# Patient Record
Sex: MALE | Race: WHITE | NOT HISPANIC OR LATINO | Employment: FULL TIME | URBAN - NONMETROPOLITAN AREA
[De-identification: names, ages, dates, MRNs, and addresses within clinical notes are randomized per-mention and may not be internally consistent; named-entity substitution may affect disease eponyms.]

---

## 2018-07-30 ENCOUNTER — OFFICE VISIT (OUTPATIENT)
Dept: INTERNAL MEDICINE | Facility: CLINIC | Age: 28
End: 2018-07-30

## 2018-07-30 DIAGNOSIS — I10 ESSENTIAL HYPERTENSION: ICD-10-CM

## 2018-07-30 DIAGNOSIS — Z13.21 ENCOUNTER FOR VITAMIN DEFICIENCY SCREENING: ICD-10-CM

## 2018-07-30 DIAGNOSIS — R07.89 ATYPICAL CHEST PAIN: Primary | ICD-10-CM

## 2018-07-30 DIAGNOSIS — R51.9 FREQUENT HEADACHES: ICD-10-CM

## 2018-07-30 DIAGNOSIS — Z00.00 PREVENTATIVE HEALTH CARE: ICD-10-CM

## 2018-07-30 PROCEDURE — 99203 OFFICE O/P NEW LOW 30 MIN: CPT | Performed by: PHYSICIAN ASSISTANT

## 2018-07-30 RX ORDER — LISINOPRIL 10 MG/1
10 TABLET ORAL DAILY
Qty: 30 TABLET | Refills: 2 | Status: SHIPPED | OUTPATIENT
Start: 2018-07-30 | End: 2018-11-05 | Stop reason: SDUPTHER

## 2018-07-30 NOTE — PROGRESS NOTES
Subjective   Justyn Hudson is a 28 y.o. male who presents to [facility].    Chief Complaint:  His mother  from a massive heart attack sometime in her 40s. He is concerned today with elevated blood pressure and occasional intermittent sharp chest pains. He has had some heartburn which he occasionally takes Tums for but does not feel it correlated with the times chest pains occur. He checked his blood pressure last week at a pharmacy which was around 140/98.     He reports a history of chronic headaches all his life which occur 1-3 times per week. Headaches are sometimes behind the eyes and typically one sided but the side varies.  He denies nausea or vomiting with headaches but has occasional photophobia. Headaches have not been worsening.         The following portions of the patient's history were reviewed and updated as appropriate: He  has no past medical history on file.  He  does not have any pertinent problems on file.  He  has a past surgical history that includes Cleft lip repair and Nose surgery.  His family history includes Cancer in his maternal grandmother; Dementia in his paternal grandmother; Diabetes in his paternal grandmother; Heart attack in his mother; Hypertension in his father; Migraines in his mother; Obesity in his father and mother; Stroke in his paternal grandmother; Leilani Parkinson White syndrome in his father.  He  reports that he has never smoked. He has never used smokeless tobacco. He reports that he drinks alcohol. He reports that he does not use drugs.  Current Outpatient Prescriptions   Medication Sig Dispense Refill   • lisinopril (PRINIVIL,ZESTRIL) 10 MG tablet Take 1 tablet by mouth Daily. 30 tablet 2     No current facility-administered medications for this visit.        Review of Systems  Review of Systems   Constitutional: Negative for activity change, appetite change, chills, fatigue, fever and unexpected weight change.        No malaise   HENT: Negative for dental  "problem, ear pain, hearing loss, nosebleeds, rhinorrhea, sore throat, trouble swallowing and voice change.    Eyes: Negative for pain, discharge, redness, itching and visual disturbance.        No dry eyes   Respiratory: Negative for cough, chest tightness, shortness of breath and wheezing.         No SOB with exertion  No SOB lying down   Cardiovascular: Positive for chest pain. Negative for palpitations and leg swelling.        No leg cramps   Gastrointestinal: Negative for abdominal pain, blood in stool, constipation, diarrhea, nausea and vomiting.        Heartburn  No change in bowel habits   Endocrine: Negative for cold intolerance, heat intolerance, polydipsia, polyphagia and polyuria.        No Muscle weakness  No feeling of weakness  No Hot flashes   Genitourinary: Negative for decreased urine volume, difficulty urinating, discharge, dysuria, frequency, hematuria, penile pain, testicular pain and urgency.        No lump on testicles   Musculoskeletal: Negative for arthralgias, gait problem, joint swelling and myalgias.        No limb pain  No limb swelling   Skin: Negative for rash and wound.        No rash  No lesions  No Itching  No change in mole   Neurological: Positive for headaches. Negative for dizziness, tremors, seizures, syncope, weakness and numbness.        No trouble walking  No confusion  No tingling       Hematological: Negative for adenopathy. Does not bruise/bleed easily.   Psychiatric/Behavioral: Negative for agitation, confusion, dysphoric mood, sleep disturbance and suicidal ideas. The patient is not nervous/anxious.         No change in personality         Objective    /98   Pulse 97   Temp 98.6 °F (37 °C)   Ht 68.5 cm (26.97\")   Wt 114 kg (252 lb)   SpO2 100%   .60 kg/m²   Physical Exam   Constitutional: He is oriented to person, place, and time. He appears well-developed and well-nourished. No distress.   HENT:   Head: Normocephalic and atraumatic.   Mouth/Throat: " Oropharynx is clear and moist.   Eyes: Pupils are equal, round, and reactive to light. EOM are normal.   Neck: Normal range of motion. Neck supple.   Cardiovascular: Normal rate, regular rhythm and normal heart sounds.  Exam reveals no gallop and no friction rub.    No murmur heard.  Pulmonary/Chest: Effort normal and breath sounds normal. No respiratory distress. He has no wheezes. He has no rales. He exhibits no tenderness.   Abdominal: Soft. Bowel sounds are normal. He exhibits no distension. There is no rebound and no guarding. No hernia.   Musculoskeletal: Normal range of motion. He exhibits no tenderness.   Neurological: He is alert and oriented to person, place, and time. No sensory deficit. Coordination normal.   Skin: Skin is warm and dry. Capillary refill takes less than 2 seconds. He is not diaphoretic. No erythema.   Psychiatric: He has a normal mood and affect. His behavior is normal. Judgment and thought content normal.   Nursing note and vitals reviewed.        Assessment/Plan      Diagnosis Plan   1. Atypical chest pain  Does not seem to be cardiac related. Requested that he be mindful of what he is doing when it occurs and what he has recently eaten for review at 2 week follow up.      2. Essential hypertension  US Renal Bilateral    Comprehensive Metabolic Panel    TSH    Begin: lisinopril (PRINIVIL,ZESTRIL) 10 MG tablet  -Risks, benefits and potential side effects of medication reviewed and patient agrees to use.      3. Preventative health care  Lipid Panel     4. Frequent headaches  Vitamin D 25 Hydroxy     5. Encounter for vitamin deficiency screening  Vitamin D 25 Hydroxy           Return in about 2 weeks (around 8/13/2018) for Next scheduled follow up.

## 2018-08-02 ENCOUNTER — HOSPITAL ENCOUNTER (OUTPATIENT)
Dept: ULTRASOUND IMAGING | Facility: HOSPITAL | Age: 28
Discharge: HOME | End: 2018-08-02
Admitting: PHYSICIAN ASSISTANT

## 2018-08-02 PROCEDURE — 76775 US EXAM ABDO BACK WALL LIM: CPT

## 2018-08-16 ENCOUNTER — OFFICE VISIT (OUTPATIENT)
Dept: INTERNAL MEDICINE | Facility: CLINIC | Age: 28
End: 2018-08-16

## 2018-08-16 VITALS
SYSTOLIC BLOOD PRESSURE: 140 MMHG | BODY MASS INDEX: 37.33 KG/M2 | HEART RATE: 97 BPM | OXYGEN SATURATION: 100 % | DIASTOLIC BLOOD PRESSURE: 98 MMHG | WEIGHT: 252 LBS | TEMPERATURE: 98.6 F | HEIGHT: 69 IN

## 2018-08-16 VITALS
DIASTOLIC BLOOD PRESSURE: 82 MMHG | OXYGEN SATURATION: 99 % | HEIGHT: 69 IN | TEMPERATURE: 98.3 F | WEIGHT: 250 LBS | BODY MASS INDEX: 37.03 KG/M2 | SYSTOLIC BLOOD PRESSURE: 126 MMHG | HEART RATE: 76 BPM

## 2018-08-16 DIAGNOSIS — I10 ESSENTIAL HYPERTENSION: Primary | ICD-10-CM

## 2018-08-16 DIAGNOSIS — R07.89 ATYPICAL CHEST PAIN: ICD-10-CM

## 2018-08-16 DIAGNOSIS — K21.9 GASTROESOPHAGEAL REFLUX DISEASE, ESOPHAGITIS PRESENCE NOT SPECIFIED: ICD-10-CM

## 2018-08-16 LAB
25(OH)D3+25(OH)D2 SERPL-MCNC: 28.6 NG/ML
ALBUMIN SERPL-MCNC: 4.7 G/DL (ref 3.5–5)
ALBUMIN/GLOB SERPL: 1.6 G/DL (ref 1–2)
ALP SERPL-CCNC: 56 U/L (ref 38–126)
ALT SERPL-CCNC: 63 U/L (ref 13–69)
AST SERPL-CCNC: 36 U/L (ref 15–46)
BILIRUB SERPL-MCNC: 0.9 MG/DL (ref 0.2–1.3)
BUN SERPL-MCNC: 13 MG/DL (ref 7–20)
BUN/CREAT SERPL: 16.3 (ref 6.3–21.9)
CALCIUM SERPL-MCNC: 10.5 MG/DL (ref 8.4–10.2)
CHLORIDE SERPL-SCNC: 100 MMOL/L (ref 98–107)
CHOLEST SERPL-MCNC: 194 MG/DL (ref 0–199)
CO2 SERPL-SCNC: 28 MMOL/L (ref 26–30)
CREAT SERPL-MCNC: 0.8 MG/DL (ref 0.6–1.3)
GLOBULIN SER CALC-MCNC: 2.9 GM/DL
GLUCOSE SERPL-MCNC: 87 MG/DL (ref 74–98)
HDLC SERPL-MCNC: 55 MG/DL (ref 40–60)
LDLC SERPL CALC-MCNC: 109 MG/DL (ref 0–99)
POTASSIUM SERPL-SCNC: 4.7 MMOL/L (ref 3.5–5.1)
PROT SERPL-MCNC: 7.6 G/DL (ref 6.3–8.2)
SODIUM SERPL-SCNC: 141 MMOL/L (ref 137–145)
TRIGL SERPL-MCNC: 150 MG/DL
TSH SERPL DL<=0.005 MIU/L-ACNC: 2.71 MIU/ML (ref 0.47–4.68)
VLDLC SERPL CALC-MCNC: 30 MG/DL

## 2018-08-16 PROCEDURE — 99213 OFFICE O/P EST LOW 20 MIN: CPT | Performed by: PHYSICIAN ASSISTANT

## 2018-08-16 RX ORDER — OMEPRAZOLE 40 MG/1
40 CAPSULE, DELAYED RELEASE ORAL DAILY
Qty: 14 CAPSULE | Refills: 0 | Status: SHIPPED | OUTPATIENT
Start: 2018-08-16 | End: 2018-08-31 | Stop reason: SDUPTHER

## 2018-08-16 NOTE — PROGRESS NOTES
Justyn Hudson is a 28 y.o. male.     Subjective   History of Present Illness   Here today for follow up of hypertension and chest pain.  Has been having chest pain more frequently since starting lisinopril 2 weeks ago, thsully blood pressure is now well controlled.  He does reports that her feels he needs to burp during the chest pain episodes. Tums does not help the chest pain.  He denies a new cough since starting lisinopril. Patient denies dyspnea, orthopnea, palpitations, lower extremity edema, headaches, weakness, visual disturbances or confusion.             The following portions of the patient's history were reviewed and updated as appropriate: allergies, current medications, past family history, past medical history, past social history, past surgical history and problem list.    Review of Systems    Constitutional: Negative for appetite change, chills, fatigue, fever and unexpected weight change.   HENT: Negative for congestion, ear pain, hearing loss, nosebleeds, postnasal drip, rhinorrhea, sore throat, tinnitus and trouble swallowing.    Eyes: Negative for photophobia, discharge and visual disturbance.   Respiratory: Negative for cough, chest tightness, shortness of breath and wheezing.    Cardiovascular: chest pain.  Negative for palpitations and leg swelling.   Gastrointestinal: belching, reflux.  Negative for abdominal distention, abdominal pain, blood in stool, constipation, diarrhea, nausea and vomiting.   Endocrine: Negative for cold intolerance, heat intolerance, polydipsia, polyphagia and polyuria.   Musculoskeletal: Negative for arthralgias, back pain, joint swelling, myalgias, neck pain and neck stiffness.   Skin: Negative for color change, pallor, rash and wound.   Allergic/Immunologic: Negative for environmental allergies, food allergies and immunocompromised state.   Neurological: Negative for dizziness, tremors, seizures, weakness, numbness and headaches.   Hematological: Negative for  "adenopathy. Does not bruise/bleed easily.   Psychiatric/Behavioral: Negative for sleep disturbances, agitation, behavioral problems, confusion, hallucinations, self-injury and suicidal ideas. The patient is not nervous/anxious.      Objective    Physical Exam  Constitutional: Appears well-developed and well-nourished.   HENT: OP normal.   Head: Normocephalic and atraumatic.   Eyes: EOM are normal. Pupils are equal, round, and reactive to light.   Neck: Normal range of motion. Neck supple.   Cardiovascular: Normal rate, regular rhythm and normal heart sounds.    Pulmonary/Chest: Effort normal and breath sounds normal. No respiratory distress.  Has no wheezes or rales. Exhibits no chest wall tenderness.   Abdominal: Soft. Bowel sounds are normal. Exhibits no distension and no mass. There is no tenderness.   Musculoskeletal: Normal range of motion. Exhibits no tenderness.   Neurological: Alert and oriented to person, place, and time.   Skin: Skin is warm and dry.   Psychiatric: Has a normal mood and affect. Behavior is normal. Judgment and thought content normal.       /82   Pulse 76   Temp 98.3 °F (36.8 °C)   Ht 174 cm (68.5\")   Wt 113 kg (250 lb)   SpO2 99%   BMI 37.46 kg/m²     Nursing note and vitals reviewed.        Assessment/Plan   Justyn was seen today for follow-up.    Diagnoses and all orders for this visit:    Essential hypertension  Well controlled with Lisinopril, continue 10 mg daily.     Atypical chest pain  Gastroesophageal reflux disease, esophagitis presence not specified  -     Begin 2 week trial: omeprazole (priLOSEC) 40 MG capsule; Take 1 capsule by mouth Daily.      Recheck in 2 weeks.        "

## 2018-08-31 ENCOUNTER — OFFICE VISIT (OUTPATIENT)
Dept: INTERNAL MEDICINE | Facility: CLINIC | Age: 28
End: 2018-08-31

## 2018-08-31 VITALS
DIASTOLIC BLOOD PRESSURE: 88 MMHG | OXYGEN SATURATION: 99 % | WEIGHT: 248 LBS | HEIGHT: 69 IN | TEMPERATURE: 98.4 F | SYSTOLIC BLOOD PRESSURE: 136 MMHG | BODY MASS INDEX: 36.73 KG/M2 | HEART RATE: 103 BPM

## 2018-08-31 DIAGNOSIS — R07.89 ATYPICAL CHEST PAIN: Primary | ICD-10-CM

## 2018-08-31 DIAGNOSIS — F41.9 ANXIETY: ICD-10-CM

## 2018-08-31 DIAGNOSIS — I10 ESSENTIAL HYPERTENSION: ICD-10-CM

## 2018-08-31 DIAGNOSIS — K21.9 GASTROESOPHAGEAL REFLUX DISEASE, ESOPHAGITIS PRESENCE NOT SPECIFIED: ICD-10-CM

## 2018-08-31 PROCEDURE — 93000 ELECTROCARDIOGRAM COMPLETE: CPT | Performed by: PHYSICIAN ASSISTANT

## 2018-08-31 PROCEDURE — 99213 OFFICE O/P EST LOW 20 MIN: CPT | Performed by: PHYSICIAN ASSISTANT

## 2018-08-31 RX ORDER — OMEPRAZOLE 20 MG/1
20 CAPSULE, DELAYED RELEASE ORAL DAILY
Qty: 30 CAPSULE | Refills: 2 | Status: SHIPPED | OUTPATIENT
Start: 2018-08-31 | End: 2018-09-06

## 2018-11-05 ENCOUNTER — TELEPHONE (OUTPATIENT)
Dept: INTERNAL MEDICINE | Facility: CLINIC | Age: 28
End: 2018-11-05

## 2018-11-05 DIAGNOSIS — I10 ESSENTIAL HYPERTENSION: ICD-10-CM

## 2018-11-05 RX ORDER — LISINOPRIL 10 MG/1
10 TABLET ORAL DAILY
Qty: 30 TABLET | Refills: 2 | Status: SHIPPED | OUTPATIENT
Start: 2018-11-05 | End: 2018-11-30 | Stop reason: ALTCHOICE

## 2018-11-30 ENCOUNTER — OFFICE VISIT (OUTPATIENT)
Dept: INTERNAL MEDICINE | Facility: CLINIC | Age: 28
End: 2018-11-30

## 2018-11-30 VITALS
TEMPERATURE: 98.4 F | DIASTOLIC BLOOD PRESSURE: 70 MMHG | WEIGHT: 247 LBS | OXYGEN SATURATION: 99 % | HEIGHT: 68 IN | HEART RATE: 100 BPM | BODY MASS INDEX: 37.44 KG/M2 | SYSTOLIC BLOOD PRESSURE: 130 MMHG

## 2018-11-30 DIAGNOSIS — I10 ESSENTIAL HYPERTENSION: ICD-10-CM

## 2018-11-30 DIAGNOSIS — Z23 NEED FOR HEPATITIS A IMMUNIZATION: ICD-10-CM

## 2018-11-30 DIAGNOSIS — R07.89 ATYPICAL CHEST PAIN: Primary | ICD-10-CM

## 2018-11-30 DIAGNOSIS — Z82.49 FAMILY HISTORY OF ASCVD (ARTERIOSCLEROTIC CARDIOVASCULAR DISEASE): ICD-10-CM

## 2018-11-30 DIAGNOSIS — Z23 NEED FOR TDAP VACCINATION: ICD-10-CM

## 2018-11-30 PROCEDURE — 99213 OFFICE O/P EST LOW 20 MIN: CPT | Performed by: PHYSICIAN ASSISTANT

## 2018-11-30 PROCEDURE — 90472 IMMUNIZATION ADMIN EACH ADD: CPT | Performed by: PHYSICIAN ASSISTANT

## 2018-11-30 PROCEDURE — 90715 TDAP VACCINE 7 YRS/> IM: CPT | Performed by: PHYSICIAN ASSISTANT

## 2018-11-30 PROCEDURE — 90632 HEPA VACCINE ADULT IM: CPT | Performed by: PHYSICIAN ASSISTANT

## 2018-11-30 PROCEDURE — 90471 IMMUNIZATION ADMIN: CPT | Performed by: PHYSICIAN ASSISTANT

## 2018-11-30 RX ORDER — LOSARTAN POTASSIUM 25 MG/1
25 TABLET ORAL DAILY
Qty: 90 TABLET | Refills: 1 | Status: SHIPPED | OUTPATIENT
Start: 2018-11-30 | End: 2019-06-17 | Stop reason: SDUPTHER

## 2018-11-30 RX ORDER — LISINOPRIL 10 MG/1
10 TABLET ORAL DAILY
Qty: 90 TABLET | Refills: 1 | Status: CANCELLED | OUTPATIENT
Start: 2018-11-30

## 2018-11-30 NOTE — PROGRESS NOTES
Justyn Hudson is a 28 y.o. male.     Subjective   History of Present Illness   Here today for 3 month follow up of hypertension. He is taking lisinopril daily as directed. He has had some dry cough for several weeks but has also had some nasal congestion and postnasal drip for the last 2 weeks. Chest pain is occurring much less frequently than before, down to around twice per month from nearly daily. Patient denies dyspnea, orthopnea, palpitations, lower extremity edema, headaches, weakness, visual disturbances or confusion.             The following portions of the patient's history were reviewed and updated as appropriate: allergies, current medications, past family history, past medical history, past social history, past surgical history and problem list.    Review of Systems    Constitutional: Negative for appetite change, chills, fatigue, fever and unexpected weight change.   HENT:  postnasal drip, congestion.  Negative for ear pain, hearing loss, nosebleeds, rhinorrhea, sore throat, tinnitus and trouble swallowing.    Eyes: Negative for photophobia, discharge and visual disturbance.   Respiratory: cough.  Negative for chest tightness, shortness of breath and wheezing.    Cardiovascular: chest pain.  Negative for palpitations and leg swelling.   Gastrointestinal: Negative for abdominal distention, abdominal pain, blood in stool, constipation, diarrhea, nausea and vomiting.   Endocrine: Negative for cold intolerance, heat intolerance, polydipsia, polyphagia and polyuria.   Musculoskeletal: Negative for arthralgias, back pain, joint swelling, myalgias, neck pain and neck stiffness.   Skin: Negative for color change, pallor, rash and wound.   Allergic/Immunologic: Negative for environmental allergies, food allergies and immunocompromised state.   Neurological: Negative for dizziness, tremors, seizures, weakness, numbness and headaches.   Hematological: Negative for adenopathy. Does not bruise/bleed easily.  "  Psychiatric/Behavioral: Negative for sleep disturbances, agitation, behavioral problems, confusion, hallucinations, self-injury and suicidal ideas. The patient is not nervous/anxious.        Objective    Physical Exam  Constitutional: Appears well-developed and well-nourished.   HENT: OP normal.   Head: Normocephalic and atraumatic.   Eyes: EOM are normal. Pupils are equal, round, and reactive to light.   Neck: Normal range of motion. Neck supple.   Cardiovascular: Normal rate, regular rhythm and normal heart sounds.    Pulmonary/Chest: Effort normal and breath sounds normal. No respiratory distress.  Has no wheezes or rales. Exhibits no chest wall tenderness.   Abdominal: Soft. Bowel sounds are normal. Exhibits no distension and no mass. There is no tenderness.   Musculoskeletal: Normal range of motion. Exhibits no tenderness.   Neurological: Alert and oriented to person, place, and time.   Skin: Skin is warm and dry.   Psychiatric: Has a normal mood and affect. Behavior is normal. Judgment and thought content normal.       /70   Pulse 100   Temp 98.4 °F (36.9 °C)   Ht 172.7 cm (67.99\")   Wt 112 kg (247 lb)   SpO2 99%   BMI 37.57 kg/m²     Nursing note and vitals reviewed.        Assessment/Plan   Justyn was seen today for follow-up.    Diagnoses and all orders for this visit:    Atypical chest pain  -     Ambulatory Referral to Cardiology    Essential hypertension  -     Change from lisinopril due to cough: losartan (COZAAR) 25 MG tablet; Take 1 tablet by mouth Daily.  -     Ambulatory Referral to Cardiology    Need for hepatitis A immunization  -     Hepatitis A Vaccine Adult IM    Need for Tdap vaccination  -     Tdap Vaccine Greater Than or Equal To 6yo IM    Family history of ASCVD (arteriosclerotic cardiovascular disease)  -     Ambulatory Referral to Cardiology        F/u 6 months or prn.          "

## 2019-04-18 ENCOUNTER — OFFICE VISIT (OUTPATIENT)
Dept: INTERNAL MEDICINE | Facility: CLINIC | Age: 29
End: 2019-04-18

## 2019-04-18 VITALS
HEIGHT: 69 IN | OXYGEN SATURATION: 98 % | TEMPERATURE: 98.4 F | SYSTOLIC BLOOD PRESSURE: 124 MMHG | WEIGHT: 238 LBS | HEART RATE: 83 BPM | DIASTOLIC BLOOD PRESSURE: 80 MMHG | BODY MASS INDEX: 35.25 KG/M2

## 2019-04-18 DIAGNOSIS — K59.00 CONSTIPATION, UNSPECIFIED CONSTIPATION TYPE: Primary | ICD-10-CM

## 2019-04-18 DIAGNOSIS — R10.30 LOWER ABDOMINAL PAIN: ICD-10-CM

## 2019-04-18 LAB
BILIRUB BLD-MCNC: ABNORMAL MG/DL
CLARITY, POC: ABNORMAL
COLOR UR: ABNORMAL
GLUCOSE UR STRIP-MCNC: NEGATIVE MG/DL
KETONES UR QL: NEGATIVE
LEUKOCYTE EST, POC: NEGATIVE
NITRITE UR-MCNC: NEGATIVE MG/ML
PH UR: 6 [PH] (ref 5–8)
PROT UR STRIP-MCNC: NEGATIVE MG/DL
RBC # UR STRIP: NEGATIVE /UL
SP GR UR: 1.02 (ref 1–1.03)
UROBILINOGEN UR QL: ABNORMAL

## 2019-04-18 PROCEDURE — 99213 OFFICE O/P EST LOW 20 MIN: CPT | Performed by: PHYSICIAN ASSISTANT

## 2019-04-18 PROCEDURE — 81003 URINALYSIS AUTO W/O SCOPE: CPT | Performed by: PHYSICIAN ASSISTANT

## 2019-04-18 NOTE — PROGRESS NOTES
Justyn Hudson is a 28 y.o. male.     Subjective   History of Present Illness   Here today with concern of constipation.  He describes sensation of constant gas buildup. He also reports pain with bowel movements.  He went to [facility] urgent care 5 days ago where he presented after 3 days of constipation with 1 day of abdominal pain.  There he had an abdominal x-ray which showed a large amount of retained stool with no evidence of small bowel obstruction and normal but scarce bowel gas pattern.  He was encouraged to begin use of Dulcolax suppository and magnesium citrate to help alleviate constipation as well as Bentyl for pain. The suppository made him feel the need to have a bowel movement urgently but he retained it for almost an hour then attempted to have a bowel movement but was unsuccessful. By the next day he was able to have a couple of bowel movements which began as watery then became more solid by the end of the day. The next day he had a lot of abdominal pressure but overall better. The next day he was again developing abdominal pain but was having small bowel movements. Yesterday the pain was constant but more severe when he had the urge to have a bowel movement but when he would try to have a bowel movement he produced a watery substance and gas only. He is now having some pain in area of the bladder when straining to have a bowel movement.  Yesterday he was a little nauseous, achy and chilled but he denies any vomiting or fever. No blood in his stool, melena, rectal pain or diarrhea.  He admits he has always struggled with occasional constipation which previously had never lasted more than a day.  He believes he may have had a hemorrhoid in the past but is unsure.  No pain in the rectum with bowel movements.  No low back pain, flank pain, dysuria, hematuria, increased frequency or urgency.           The following portions of the patient's history were reviewed and updated as appropriate: allergies,  current medications, past family history, past medical history, past social history, past surgical history and problem list.    Review of Systems   Constitutional: Positive for chills. Negative for activity change, appetite change, diaphoresis, fatigue, fever and unexpected weight change.   Respiratory: Negative for cough, chest tightness, shortness of breath and wheezing.    Cardiovascular: Negative for chest pain, palpitations and leg swelling.   Gastrointestinal: Positive for abdominal distention, abdominal pain, constipation and nausea. Negative for anal bleeding, blood in stool, diarrhea, rectal pain and vomiting.   Genitourinary: Negative.  Negative for decreased urine volume, difficulty urinating, enuresis, frequency, genital sores, penile pain, scrotal swelling and urgency.   Musculoskeletal: Positive for arthralgias.   Skin: Negative for color change and wound.   Neurological: Negative for dizziness, tremors, syncope, weakness and headaches.   Hematological: Negative for adenopathy. Does not bruise/bleed easily.   Psychiatric/Behavioral: Negative for agitation, behavioral problems, confusion, dysphoric mood, self-injury and suicidal ideas. The patient is not nervous/anxious.          Objective    Physical Exam   Constitutional: He is oriented to person, place, and time. He appears well-developed and well-nourished. No distress.   HENT:   Head: Normocephalic and atraumatic.   Mouth/Throat: Oropharynx is clear and moist.   Eyes: Conjunctivae and EOM are normal. Pupils are equal, round, and reactive to light.   Neck: Normal range of motion. Neck supple.   Cardiovascular: Normal rate, regular rhythm and normal heart sounds. Exam reveals no gallop and no friction rub.   No murmur heard.  Pulmonary/Chest: Effort normal and breath sounds normal. No respiratory distress. He has no wheezes. He has no rales. He exhibits no tenderness.   Abdominal: Soft. Bowel sounds are normal. He exhibits no distension and no mass.  "There is tenderness (diffusely in lower abdomen). There is no rebound and no guarding. No hernia.   Musculoskeletal: Normal range of motion. He exhibits no edema, tenderness or deformity.   Lymphadenopathy:     He has no cervical adenopathy.   Neurological: He is alert and oriented to person, place, and time. He displays normal reflexes. No cranial nerve deficit or sensory deficit. He exhibits normal muscle tone. Coordination normal.   Skin: Skin is warm and dry. Capillary refill takes less than 2 seconds. No rash noted. He is not diaphoretic. No pallor.   Psychiatric: He has a normal mood and affect. His behavior is normal. Judgment and thought content normal.   Nursing note and vitals reviewed.        /80   Pulse 83   Temp 98.4 °F (36.9 °C)   Ht 174 cm (68.5\")   Wt 108 kg (238 lb)   SpO2 98%   BMI 35.66 kg/m²     Nursing note and vitals reviewed.        Assessment/Plan   Justyn was seen today for constipation.    Diagnoses and all orders for this visit:      Lower abdominal pain  -     POCT urinalysis dipstick, automated  Brief Urine Lab Results  (Last result in the past 365 days)      Color   Clarity   Blood   Leuk Est   Nitrite   Protein   CREAT   Urine HCG        04/18/19 1559 Dark Yellow Cloudy Negative Negative Negative Negative               Constipation, unspecified constipation type  Tonight- 1/2 bottle magnesium citrate and use enema.  Tomorrow-if needed use half bottle of magnesium citrate.  Begin MiraLAX 1 capful daily as needed for maintenance of constipation.    He has been instructed to call or return to clinic if symptoms worsen or persist and CT abdomen and pelvis will be considered.    Urgent care record reviewed.               "

## 2019-06-17 DIAGNOSIS — I10 ESSENTIAL HYPERTENSION: ICD-10-CM

## 2019-06-18 RX ORDER — LOSARTAN POTASSIUM 25 MG/1
25 TABLET ORAL DAILY
Qty: 90 TABLET | Refills: 1 | Status: SHIPPED | OUTPATIENT
Start: 2019-06-18 | End: 2020-01-16 | Stop reason: SDUPTHER

## 2019-08-13 ENCOUNTER — OFFICE VISIT (OUTPATIENT)
Dept: INTERNAL MEDICINE | Facility: CLINIC | Age: 29
End: 2019-08-13

## 2019-08-13 VITALS
TEMPERATURE: 98.5 F | HEIGHT: 69 IN | WEIGHT: 233 LBS | SYSTOLIC BLOOD PRESSURE: 130 MMHG | OXYGEN SATURATION: 100 % | HEART RATE: 115 BPM | DIASTOLIC BLOOD PRESSURE: 76 MMHG | BODY MASS INDEX: 34.51 KG/M2

## 2019-08-13 DIAGNOSIS — N50.812 LEFT TESTICULAR PAIN: Primary | ICD-10-CM

## 2019-08-13 DIAGNOSIS — K59.00 CONSTIPATION, UNSPECIFIED CONSTIPATION TYPE: ICD-10-CM

## 2019-08-13 PROCEDURE — 99214 OFFICE O/P EST MOD 30 MIN: CPT | Performed by: PHYSICIAN ASSISTANT

## 2019-08-13 NOTE — PROGRESS NOTES
Justyn Hudson is a 29 y.o. male.     Subjective   History of Present Illness   Here today with concern of constipation for the last few weeks.  One day he had constipation, cold chills and abdominal pain similar to an episode around 4 months ago so he drank 1/2 bottle of Magnesium Citrate which helped some but he still had to strain to have a bowel movement.  He continued to have abdominal pain which improved rapidly over the following 2 days, though he felt like he had been kicked in the groin and had some tenderness in the left testicle.  He drank the other 1/2 bottle 2-3 days later because he felt he still had some unresolved constipation.  Every once in a while he still has a pain in the left testicle.  He had better bowel movements for a week or so but since then has had some difficulty with less productive bowel movements.  A few days ago he took 2 stool softeners which helped produce a better bowel movement the following day.  BMs are now every other day, though previously he had bowel movements 2-3 times per day.  He has had hemorrhoids in the past.  No melena or BRBPR. No fever, diarrhea, nausea or vomiting.         The following portions of the patient's history were reviewed and updated as appropriate: allergies, current medications, past family history, past medical history, past social history, past surgical history and problem list.    Review of Systems   Constitutional: Positive for chills (resolved). Negative for activity change, appetite change, diaphoresis, fatigue, fever and unexpected weight change.   Respiratory: Negative for cough, chest tightness, shortness of breath and wheezing.    Cardiovascular: Negative for chest pain, palpitations and leg swelling.   Gastrointestinal: Positive for abdominal pain and constipation. Negative for abdominal distention, anal bleeding, blood in stool, diarrhea, nausea, rectal pain and vomiting.   Genitourinary: Positive for testicular pain. Negative for  decreased urine volume, difficulty urinating, discharge, dysuria, enuresis, flank pain, frequency, hematuria, penile pain, penile swelling, scrotal swelling and urgency.   Musculoskeletal: Negative for arthralgias, back pain, gait problem, myalgias and neck stiffness.   Neurological: Negative for dizziness, syncope, facial asymmetry, weakness, numbness and headaches.   Hematological: Negative for adenopathy. Does not bruise/bleed easily.   Psychiatric/Behavioral: Negative for behavioral problems, confusion, dysphoric mood, self-injury and suicidal ideas. The patient is not nervous/anxious.          Objective    Physical Exam   Constitutional: He is oriented to person, place, and time. He appears well-developed and well-nourished. No distress.   HENT:   Head: Normocephalic and atraumatic.   Eyes: Conjunctivae and EOM are normal. Pupils are equal, round, and reactive to light.   Neck: Normal range of motion. Neck supple.   Cardiovascular: Normal rate, regular rhythm and normal heart sounds. Exam reveals no gallop and no friction rub.   No murmur heard.  Pulmonary/Chest: Effort normal and breath sounds normal. No respiratory distress. He has no wheezes. He has no rales.   Abdominal: Soft. Bowel sounds are normal. He exhibits no distension and no mass. There is tenderness (very mild diffuse). There is no rebound and no guarding. No hernia. Hernia confirmed negative in the right inguinal area and confirmed negative in the left inguinal area.   Genitourinary: Penis normal. Right testis shows no mass, no swelling and no tenderness. Right testis is descended. Cremasteric reflex is not absent on the right side. Left testis shows tenderness. Left testis shows no mass and no swelling. Left testis is descended. Cremasteric reflex is not absent on the left side. Uncircumcised.   Musculoskeletal: Normal range of motion. He exhibits no edema, tenderness or deformity.   Lymphadenopathy: No inguinal adenopathy noted on the right or  "left side.   Neurological: He is alert and oriented to person, place, and time. He displays normal reflexes. No cranial nerve deficit or sensory deficit. He exhibits normal muscle tone. Coordination normal.   Skin: Skin is warm and dry. Capillary refill takes less than 2 seconds. No rash noted. He is not diaphoretic.   Psychiatric: He has a normal mood and affect. His behavior is normal. Judgment and thought content normal.   Nursing note and vitals reviewed.        /76   Pulse 115   Temp 98.5 °F (36.9 °C)   Ht 174 cm (68.5\")   Wt 106 kg (233 lb)   SpO2 100%   BMI 34.91 kg/m²     Nursing note and vitals reviewed.          Assessment/Plan   Justyn was seen today for constipation.    Diagnoses and all orders for this visit:    Left testicular pain  -     US Testicular or Ovarian Vascular Complete    Constipation, unspecified constipation type    Continue stool softener daily. Begin Mirilax daily prn. Increase water intake and physical activity level. Avoid straining with bowel movements.       Call or RTC if symptoms worsen or persist.        "

## 2019-08-19 ENCOUNTER — HOSPITAL ENCOUNTER (OUTPATIENT)
Dept: ULTRASOUND IMAGING | Facility: HOSPITAL | Age: 29
Discharge: HOME | End: 2019-08-19
Admitting: PHYSICIAN ASSISTANT

## 2019-08-19 PROCEDURE — 76870 US EXAM SCROTUM: CPT

## 2019-08-20 DIAGNOSIS — N50.3 CYST OF EPIDIDYMIS DETERMINED BY ULTRASOUND: ICD-10-CM

## 2019-08-20 DIAGNOSIS — N50.812 LEFT TESTICULAR PAIN: Primary | ICD-10-CM

## 2019-09-10 ENCOUNTER — OFFICE VISIT (OUTPATIENT)
Dept: UROLOGY | Facility: CLINIC | Age: 29
End: 2019-09-10

## 2019-09-10 VITALS
BODY MASS INDEX: 34.51 KG/M2 | OXYGEN SATURATION: 99 % | HEART RATE: 69 BPM | WEIGHT: 233 LBS | HEIGHT: 69 IN | SYSTOLIC BLOOD PRESSURE: 122 MMHG | DIASTOLIC BLOOD PRESSURE: 72 MMHG

## 2019-09-10 DIAGNOSIS — N50.819 TESTICULAR PAIN: Primary | ICD-10-CM

## 2019-09-10 LAB
BILIRUB BLD-MCNC: NEGATIVE MG/DL
CLARITY, POC: CLEAR
COLOR UR: YELLOW
GLUCOSE UR STRIP-MCNC: NEGATIVE MG/DL
KETONES UR QL: NEGATIVE
LEUKOCYTE EST, POC: NEGATIVE
NITRITE UR-MCNC: NEGATIVE MG/ML
PH UR: 7 [PH] (ref 5–8)
PROT UR STRIP-MCNC: NEGATIVE MG/DL
RBC # UR STRIP: NEGATIVE /UL
SP GR UR: 1.01 (ref 1–1.03)
UROBILINOGEN UR QL: NORMAL

## 2019-09-10 PROCEDURE — 99203 OFFICE O/P NEW LOW 30 MIN: CPT | Performed by: UROLOGY

## 2019-09-10 PROCEDURE — 81003 URINALYSIS AUTO W/O SCOPE: CPT | Performed by: UROLOGY

## 2019-09-10 NOTE — PROGRESS NOTES
Chief Complaint  Testicular Pain        FELIX Hudson is a 29 y.o. male who heard for evaluation of testicular pain that has been present for several weeks that is intermittent but not severe.  This all started when he was struggling with constipation and doing a lot of straining.  He denies any contact with sexually transmitted disease or other trauma that could have precipitated the discomfort.  He brings with him an ultrasound of the scrotum that reveals normal testicle and cord structures except for tiny cysts.  He denies any difficulty voiding describing an AUA index of only 2.  He states his urine is been checked and never found to have infection.  He has had a lot of surgeries in the past related to cleft lip repair.  Currently the discomfort is minimal.    Vitals:    09/10/19 1508   BP: 122/72   Pulse: 69   SpO2: 99%       Past Medical History  Past Medical History:   Diagnosis Date   • Hypertension        Past Surgical History  Past Surgical History:   Procedure Laterality Date   • CLEFT LIP REPAIR      several surgeries   • NOSE SURGERY         Medications  has a current medication list which includes the following prescription(s): losartan.      Allergies  Allergies   Allergen Reactions   • Morphine And Related Other (See Comments)     Skin crawl       Social History  Social History     Social History Narrative   • Not on file       Family History  He has no family history of bladder or kidney cancer  He has no family history of kidney stones      AUA Symptom Score:      Review of Systems  Review of Systems  Positive for frequency but otherwise negative in all categories.  Physical Exam  Physical Exam   Constitutional: He is oriented to person, place, and time. He appears well-developed and well-nourished.   HENT:   Head: Normocephalic and atraumatic.   Neck: Normal range of motion.   Pulmonary/Chest: Effort normal. No respiratory distress.   Abdominal: Soft. He exhibits no distension and no mass. There is  no tenderness. No hernia. Hernia confirmed negative in the right inguinal area and confirmed negative in the left inguinal area.   Genitourinary: Testes normal and penis normal.   Musculoskeletal: Normal range of motion.   Lymphadenopathy:     He has no cervical adenopathy. No inguinal adenopathy noted on the right or left side.   Neurological: He is alert and oriented to person, place, and time.   Skin: Skin is warm and dry.   Psychiatric: He has a normal mood and affect. His behavior is normal.   Vitals reviewed.      Labs Recent and today in the office:  Results for orders placed or performed in visit on 09/10/19   POC Urinalysis Dipstick, Automated   Result Value Ref Range    Color Yellow Yellow, Straw, Dark Yellow, Deann    Clarity, UA Clear Clear    Specific Gravity  1.010 1.005 - 1.030    pH, Urine 7.0 5.0 - 8.0    Leukocytes Negative Negative    Nitrite, UA Negative Negative    Protein, POC Negative Negative mg/dL    Glucose, UA Negative Negative, 1000 mg/dL (3+) mg/dL    Ketones, UA Negative Negative    Urobilinogen, UA Normal Normal    Bilirubin Negative Negative    Blood, UA Negative Negative         Assessment & Plan  Scrotal pain: There is no palpable pathology on physical exam but the globus major of the left epididymis is identified as the location of discomfort.  Ultrasound revealed a tiny cyst in this area but nothing is palpable.  I feel he probably has a little low-grade sterile reflux epididymitis secondary to straining with bowel movements.  He is encouraged to eat a high-fiber diet and avoid straining with defecation.

## 2020-01-16 ENCOUNTER — PATIENT MESSAGE (OUTPATIENT)
Dept: INTERNAL MEDICINE | Facility: CLINIC | Age: 30
End: 2020-01-16

## 2020-01-16 DIAGNOSIS — I10 ESSENTIAL HYPERTENSION: ICD-10-CM

## 2020-01-16 RX ORDER — LOSARTAN POTASSIUM 25 MG/1
25 TABLET ORAL DAILY
Qty: 90 TABLET | Refills: 1 | Status: SHIPPED | OUTPATIENT
Start: 2020-01-16 | End: 2020-04-20 | Stop reason: SDUPTHER

## 2020-04-20 DIAGNOSIS — I10 ESSENTIAL HYPERTENSION: ICD-10-CM

## 2020-04-20 RX ORDER — LOSARTAN POTASSIUM 25 MG/1
25 TABLET ORAL DAILY
Qty: 90 TABLET | Refills: 1 | Status: SHIPPED | OUTPATIENT
Start: 2020-04-20 | End: 2020-11-05 | Stop reason: SDUPTHER

## 2020-05-14 DIAGNOSIS — R10.30 LOWER ABDOMINAL PAIN: ICD-10-CM

## 2020-05-14 DIAGNOSIS — K59.00 CONSTIPATION, UNSPECIFIED CONSTIPATION TYPE: Primary | ICD-10-CM

## 2020-05-27 ENCOUNTER — OFFICE VISIT (OUTPATIENT)
Dept: GASTROENTEROLOGY | Facility: CLINIC | Age: 30
End: 2020-05-27

## 2020-05-27 VITALS
SYSTOLIC BLOOD PRESSURE: 124 MMHG | TEMPERATURE: 97.3 F | HEART RATE: 69 BPM | HEIGHT: 69 IN | BODY MASS INDEX: 34.87 KG/M2 | WEIGHT: 235.4 LBS | OXYGEN SATURATION: 98 % | DIASTOLIC BLOOD PRESSURE: 80 MMHG | RESPIRATION RATE: 18 BRPM

## 2020-05-27 DIAGNOSIS — R10.30 LOWER ABDOMINAL PAIN: ICD-10-CM

## 2020-05-27 DIAGNOSIS — R19.4 CHANGE IN BOWEL HABITS: Primary | ICD-10-CM

## 2020-05-27 DIAGNOSIS — K58.1 IRRITABLE BOWEL SYNDROME WITH CONSTIPATION: ICD-10-CM

## 2020-05-27 DIAGNOSIS — E66.09 CLASS 2 OBESITY DUE TO EXCESS CALORIES WITHOUT SERIOUS COMORBIDITY WITH BODY MASS INDEX (BMI) OF 35.0 TO 35.9 IN ADULT: ICD-10-CM

## 2020-05-27 PROCEDURE — 99204 OFFICE O/P NEW MOD 45 MIN: CPT | Performed by: INTERNAL MEDICINE

## 2020-05-27 RX ORDER — DICYCLOMINE HCL 20 MG
20 TABLET ORAL 3 TIMES DAILY PRN
Qty: 60 TABLET | Refills: 1 | Status: SHIPPED | OUTPATIENT
Start: 2020-05-27 | End: 2020-07-13 | Stop reason: SDUPTHER

## 2020-05-27 RX ORDER — POLYETHYLENE GLYCOL 3350 17 G/17G
17 POWDER, FOR SOLUTION ORAL DAILY
Qty: 30 PACKET | Refills: 2 | Status: SHIPPED | OUTPATIENT
Start: 2020-05-27 | End: 2020-07-03 | Stop reason: SDUPTHER

## 2020-05-27 NOTE — PATIENT INSTRUCTIONS
Low-FODMAP Eating Plan    FODMAPs (fermentable oligosaccharides, disaccharides, monosaccharides, and polyols) are sugars that are hard for some people to digest. A low-FODMAP eating plan may help some people who have bowel (intestinal) diseases to manage their symptoms.  This meal plan can be complicated to follow. Work with a diet and nutrition specialist (dietitian) to make a low-FODMAP eating plan that is right for you. A dietitian can make sure that you get enough nutrition from this diet.  What are tips for following this plan?  Reading food labels  · Check labels for hidden FODMAPs such as:  ? High-fructose syrup.  ? Honey.  ? Agave.  ? Natural fruit flavors.  ? Onion or garlic powder.  · Choose low-FODMAP foods that contain 3-4 grams of fiber per serving.  · Check food labels for serving sizes. Eat only one serving at a time to make sure FODMAP levels stay low.  Meal planning  · Follow a low-FODMAP eating plan for up to 6 weeks, or as told by your health care provider or dietitian.  · To follow the eating plan:  1. Eliminate high-FODMAP foods from your diet completely.  2. Gradually reintroduce high-FODMAP foods into your diet one at a time. Most people should wait a few days after introducing one high-FODMAP food before they introduce the next high-FODMAP food. Your dietitian can recommend how quickly you may reintroduce foods.  3. Keep a daily record of what you eat and drink, and make note of any symptoms that you have after eating.  4. Review your daily record with a dietitian regularly. Your dietitian can help you identify which foods you can eat and which foods you should avoid.  General tips  · Drink enough fluid each day to keep your urine pale yellow.  · Avoid processed foods. These often have added sugar and may be high in FODMAPs.  · Avoid most dairy products, whole grains, and sweeteners.  · Work with a dietitian to make sure you get enough fiber in your diet.  Recommended  "foods  Grains  · Gluten-free grains, such as rice, oats, buckwheat, quinoa, corn, polenta, and millet. Gluten-free pasta, bread, or cereal. Rice noodles. Corn tortillas.  Vegetables  · Eggplant, zucchini, cucumber, peppers, green beans, [place] sprouts, bean sprouts, lettuce, arugula, kale, Swiss chard, spinach, marcus greens, bok anil, summer squash, potato, and tomato. Limited amounts of corn, carrot, and sweet potato. Green parts of scallions.  Fruits  · Bananas, oranges, jaime, limes, blueberries, raspberries, strawberries, grapes, cantaloupe, honeydew melon, kiwi, papaya, passion fruit, and pineapple. Limited amounts of dried cranberries, banana chips, and shredded coconut.  Dairy  · Lactose-free milk, yogurt, and kefir. Lactose-free cottage cheese and ice cream. Non-dairy milks, such as almond, coconut, hemp, and rice milk. Yogurts made of non-dairy milks. Limited amounts of goat cheese, brie, mozzarella, parmesan, swiss, and other hard cheeses.  Meats and other protein foods  · Unseasoned beef, pork, poultry, or fish. Eggs. Delgado. Tofu (firm) and tempeh. Limited amounts of nuts and seeds, such as almonds, walnuts, brazil nuts, pecans, peanuts, pumpkin seeds, sky seeds, and sunflower seeds.  Fats and oils  · Butter-free spreads. Vegetable oils, such as olive, canola, and sunflower oil.  Seasoning and other foods  · Artificial sweeteners with names that do not end in \"ol\" such as aspartame, saccharine, and stevia. Maple syrup, white table sugar, raw sugar, brown sugar, and molasses. Fresh basil, coriander, parsley, rosemary, and thyme.  Beverages  · Water and mineral water. Sugar-sweetened soft drinks. Small amounts of orange juice or cranberry juice. Black and green tea. Most dry manuela. Coffee.  This may not be a complete list of low-FODMAP foods. Talk with your dietitian for more information.  Foods to avoid  Grains  · Wheat, including kamut, durum, and semolina. Barley and bulgur. Couscous. Wheat-based " cereals. Wheat noodles, bread, crackers, and pastries.  Vegetables  · Chicory root, artichoke, asparagus, cabbage, snow peas, sugar snap peas, mushrooms, and cauliflower. Onions, garlic, leeks, and the white part of scallions.  Fruits  · Fresh, dried, and juiced forms of apple, pear, watermelon, peach, plum, cherries, apricots, blackberries, boysenberries, figs, nectarines, and corbin. Avocado.  Dairy  · Milk, yogurt, ice cream, and soft cheese. Cream and sour cream. Milk-based sauces. Custard.  Meats and other protein foods  · Fried or fatty meat. Sausage. Cashews and pistachios. Soybeans, baked beans, black beans, chickpeas, kidney beans, araseli beans, navy beans, lentils, and split peas.  Seasoning and other foods  · Any sugar-free gum or candy. Foods that contain artificial sweeteners such as sorbitol, mannitol, isomalt, or xylitol. Foods that contain honey, high-fructose corn syrup, or agave. Bouillon, vegetable stock, beef stock, and chicken stock. Garlic and onion powder. Condiments made with onion, such as hummus, chutney, pickles, relish, salad dressing, and salsa. Tomato paste.  Beverages  · Chicory-based drinks. Coffee substitutes. Chamomile tea. Fennel tea. Sweet or fortified manuela such as port or ulysses. Diet soft drinks made with isomalt, mannitol, maltitol, sorbitol, or xylitol. Apple, pear, and corbin juice. Juices with high-fructose corn syrup.  This may not be a complete list of high-FODMAP foods. Talk with your dietitian to discuss what dietary choices are best for you.   Summary  · A low-FODMAP eating plan is a short-term diet that eliminates FODMAPs from your diet to help ease symptoms of certain bowel diseases.  · The eating plan usually lasts up to 6 weeks. After that, high-FODMAP foods are restarted gradually, one at a time, so you can find out which may be causing symptoms.  · A low-FODMAP eating plan can be complicated. It is best to work with a dietitian who has experience with this type of  plan.  This information is not intended to replace advice given to you by your health care provider. Make sure you discuss any questions you have with your health care provider.  Document Released: 08/14/2018 Document Revised: 11/30/2018 Document Reviewed: 08/14/2018  Elsevier Patient Education © 2020 [employer]

## 2020-05-27 NOTE — PROGRESS NOTES
New Patient Consult      Date: 2020   Patient Name: Justyn Hudson  MRN: [id number]  : 1990     Referring Physician: Grecia Schmitz, *    Chief Complaint   Patient presents with   • Abdominal Pain   • Constipation       History of Present Illness: Justyn Hudson is a 29 y.o. male who is here today to establish care with Gastroenterology for abdominal pain and constipation.     History of Mid abdominal pain with constipation on and off for the last one year.  This pain episode was started in march along with a constipation episode, pain was gradual in onset, intermittent, lower abdomen, moderate  in severity and aching in nature. Feels like bloated feeling.  Frequency being once in few days to sometimes daily.  The pain may last for several minutes to hours.  There is no radiation of abdominal pain.  Eating worsens the abdominal bloatin.  No definite relieving factors of abdominal pain. He is feeling better now. Chronic constipation with bowel movement once daily but lot of gas and small pellets of stool. No loose stool.  He took MiraLAX for a few days which helped him but not taking anything at the moment.    Occasional nausea but no vomiting or abdominal distension.  No history of acid reflux, odynophagia or dysphagia. Deny any hematochezia or melena. There is no prior history of liver or pancreatic disease. There is no history of anemia. No prior history of EGD or colonoscopy. No family history of colon cancer or any GI malignancy.  He is a moderate drinker, No cigarette smoking. No prior abdominal surgeries.       Subjective      Past Medical History:   Past Medical History:   Diagnosis Date   • Hypertension        Past Surgical History:   Past Surgical History:   Procedure Laterality Date   • CLEFT LIP REPAIR      several surgeries   • NOSE SURGERY         Family History:   Family History   Problem Relation Age of Onset   • Heart attack Mother         sometimes in her 40s   • Obesity Mother     • Migraines Mother    • Hypertension Father    • Obesity Father    • Leilani Parkinson White syndrome Father    • Cancer Maternal Grandmother         possibly lung cancer, unsure   • Diabetes Paternal Grandmother    • Dementia Paternal Grandmother    • Stroke Paternal Grandmother        Social History:   Social History     Socioeconomic History   • Marital status: Single     Spouse name: Not on file   • Number of children: Not on file   • Years of education: Not on file   • Highest education level: Not on file   Tobacco Use   • Smoking status: Never Smoker   • Smokeless tobacco: Never Used   Substance and Sexual Activity   • Alcohol use: Yes     Comment: 4 drinks a week   • Drug use: No         Current Outpatient Medications:   •  losartan (Cozaar) 25 MG tablet, Take 1 tablet by mouth Daily., Disp: 90 tablet, Rfl: 1  •  dicyclomine (BENTYL) 20 MG tablet, Take 1 tablet by mouth 3 (Three) Times a Day As Needed (abdominal mcramps)., Disp: 60 tablet, Rfl: 1  •  polyethylene glycol (MIRALAX) 17 g packet, Take 17 g by mouth Daily., Disp: 30 packet, Rfl: 2    Allergies   Allergen Reactions   • Morphine And Related Other (See Comments)     Skin crawl       Review of Systems:   Review of Systems   Constitutional: Negative for appetite change, fatigue, fever and unexpected weight loss.   Respiratory: Negative for cough, shortness of breath and wheezing.    Cardiovascular: Negative for chest pain, palpitations and leg swelling.   Gastrointestinal: Positive for abdominal pain, constipation and indigestion. Negative for abdominal distention, anal bleeding, blood in stool, diarrhea, nausea, rectal pain and vomiting.   Genitourinary: Negative for dysuria, frequency and hematuria.   Musculoskeletal: Negative for back pain and joint swelling.   Skin: Negative for color change, rash and skin lesions.   Neurological: Negative for dizziness, syncope, speech difficulty, weakness, headache and memory problem.   Hematological: Negative  "for adenopathy. Does not bruise/bleed easily.   Psychiatric/Behavioral: Negative for agitation, behavioral problems, suicidal ideas and depressed mood.       The following portions of the patient's history were reviewed and updated as appropriate: allergies, current medications, past family history, past medical history, past social history, past surgical history and problem list.    Objective     Physical Exam:  Vital Signs:   Vitals:    05/27/20 0859   BP: 124/80   Pulse: 69   Resp: 18   Temp: 97.3 °F (36.3 °C)   TempSrc: Temporal   SpO2: 98%   Weight: 107 kg (235 lb 6.4 oz)   Height: 174 cm (68.5\")       Physical Exam   Constitutional: He is oriented to person, place, and time. He appears well-developed and well-nourished.   Obese   HENT:   Head: Normocephalic and atraumatic.   Right Ear: External ear normal.   Left Ear: External ear normal.   Mouth/Throat: Oropharynx is clear and moist.   Eyes: Pupils are equal, round, and reactive to light. Conjunctivae and EOM are normal.   Neck: Normal range of motion. No tracheal deviation present. No thyromegaly present.   Cardiovascular: Normal rate and regular rhythm.   No murmur heard.  Pulmonary/Chest: Effort normal and breath sounds normal. No respiratory distress.   Abdominal: Soft. Bowel sounds are normal. He exhibits no distension and no mass. There is tenderness (Mild lower abdominal discomfort on deep palpation both flanks). There is no guarding. No hernia.   Musculoskeletal: Normal range of motion. He exhibits no edema.   Neurological: He is alert and oriented to person, place, and time. No cranial nerve deficit or sensory deficit.   Skin: Skin is warm and dry.   Psychiatric: He has a normal mood and affect. His behavior is normal. Judgment and thought content normal.   Nursing note and vitals reviewed.      Results Review:   I have reviewed the patient's new clinical and imaging results and agree with the interpretation.     No visits with results within 90 " Day(s) from this visit.   Latest known visit with results is:   Office Visit on 09/10/2019   Component Date Value Ref Range Status   • Color 09/10/2019 Yellow  Yellow, Straw, Dark Yellow, Deann Final   • Clarity, UA 09/10/2019 Clear  Clear Final   • Specific Gravity  09/10/2019 1.010  1.005 - 1.030 Final   • pH, Urine 09/10/2019 7.0  5.0 - 8.0 Final   • Leukocytes 09/10/2019 Negative  Negative Final   • Nitrite, UA 09/10/2019 Negative  Negative Final   • Protein, POC 09/10/2019 Negative  Negative mg/dL Final   • Glucose, UA 09/10/2019 Negative  Negative, 1000 mg/dL (3+) mg/dL Final   • Ketones, UA 09/10/2019 Negative  Negative Final   • Urobilinogen, UA 09/10/2019 Normal  Normal Final   • Bilirubin 09/10/2019 Negative  Negative Final   • Blood, UA 09/10/2019 Negative  Negative Final      No radiology results for the last 90 days.    Assessment / Plan      Assessment & Plan:  1. Change in bowel habits  Is a clinical history is more in favor of irritable bowel syndrome with constipation.   We had a discussion on dietary changes lifestyle changes cutting down and stop smoking and losing some weight  I have advised him to start on a Metamucil 1 scoop p.o. twice daily, titrate down to daily if there is any significant bloating  Started on MiraLAX 17 g p.o. Daily.  No recent lab studies on record  We will also get a TSH, celiac screen, basic lab studies  No immediate indication for any endoscopy at this time however we will consider an endoscopy depending on his progress with the above measures  We will follow-up in 3 months time  - CBC Auto Differential; Future  - Comprehensive Metabolic Panel; Future  - TSH  - IgA; Future  - Tissue Transglutaminase, IgA; Future    2. Lower abdominal pain  Also appears to be secondary to IBS  Started on a Bentyl as needed  We will consider further imaging depending on response and lab works.  His recent abdominal x-ray was unremarkable    3. Irritable bowel syndrome with  constipation  Suspected IBS with constipation    4. Class 2 obesity due to excess calories without serious comorbidity with body mass index (BMI) of 35.0 to 35.9 in adult  No recent lab studies to interpret the liver enzymes.  He may have some fatty liver  Advised regular exercise half hour every day at least 3 days in a week.   Reduced calorie intake  and lifestyle and dietary changes  have been discussed  Wt reduction of at least 5-7% of the current body weight has been discussed.   Advised to avoid alcohol and smoking cigarette      Follow Up:   Return in about 3 months (around 8/27/2020).    Saravanan Long MD  [facility]  5/27/2020  09:45    Please note that portions of this note may have been completed with a voice recognition program. Efforts were made to edit the dictations, but occasionally words are mistranscribed.

## 2020-06-04 ENCOUNTER — LAB (OUTPATIENT)
Dept: LAB | Facility: HOSPITAL | Age: 30
End: 2020-06-04

## 2020-06-04 DIAGNOSIS — R19.4 CHANGE IN BOWEL HABITS: ICD-10-CM

## 2020-06-04 DIAGNOSIS — K58.1 IRRITABLE BOWEL SYNDROME WITH CONSTIPATION: ICD-10-CM

## 2020-06-04 DIAGNOSIS — R10.30 LOWER ABDOMINAL PAIN: ICD-10-CM

## 2020-06-04 LAB
ALBUMIN SERPL-MCNC: 5 G/DL (ref 3.5–5.2)
ALBUMIN/GLOB SERPL: 2.2 G/DL
ALP SERPL-CCNC: 53 U/L (ref 39–117)
ALT SERPL W P-5'-P-CCNC: 33 U/L (ref 1–41)
ANION GAP SERPL CALCULATED.3IONS-SCNC: 12.2 MMOL/L (ref 5–15)
AST SERPL-CCNC: 22 U/L (ref 1–40)
BASOPHILS # BLD AUTO: 0.04 10*3/MM3 (ref 0–0.2)
BASOPHILS NFR BLD AUTO: 0.7 % (ref 0–1.5)
BILIRUB SERPL-MCNC: 0.8 MG/DL (ref 0.2–1.2)
BUN BLD-MCNC: 14 MG/DL (ref 6–20)
BUN/CREAT SERPL: 15.1 (ref 7–25)
CALCIUM SPEC-SCNC: 9.8 MG/DL (ref 8.6–10.5)
CHLORIDE SERPL-SCNC: 100 MMOL/L (ref 98–107)
CO2 SERPL-SCNC: 26.8 MMOL/L (ref 22–29)
CREAT BLD-MCNC: 0.93 MG/DL (ref 0.76–1.27)
DEPRECATED RDW RBC AUTO: 38.7 FL (ref 37–54)
EOSINOPHIL # BLD AUTO: 0.38 10*3/MM3 (ref 0–0.4)
EOSINOPHIL NFR BLD AUTO: 6.9 % (ref 0.3–6.2)
ERYTHROCYTE [DISTWIDTH] IN BLOOD BY AUTOMATED COUNT: 12.3 % (ref 12.3–15.4)
GFR SERPL CREATININE-BSD FRML MDRD: 97 ML/MIN/1.73
GLOBULIN UR ELPH-MCNC: 2.3 GM/DL
GLUCOSE BLD-MCNC: 81 MG/DL (ref 65–99)
HCT VFR BLD AUTO: 46.1 % (ref 37.5–51)
HGB BLD-MCNC: 16.4 G/DL (ref 13–17.7)
IGA1 MFR SER: 132 MG/DL (ref 70–400)
IMM GRANULOCYTES # BLD AUTO: 0.01 10*3/MM3 (ref 0–0.05)
IMM GRANULOCYTES NFR BLD AUTO: 0.2 % (ref 0–0.5)
LYMPHOCYTES # BLD AUTO: 1.81 10*3/MM3 (ref 0.7–3.1)
LYMPHOCYTES NFR BLD AUTO: 33 % (ref 19.6–45.3)
MCH RBC QN AUTO: 30.8 PG (ref 26.6–33)
MCHC RBC AUTO-ENTMCNC: 35.6 G/DL (ref 31.5–35.7)
MCV RBC AUTO: 86.5 FL (ref 79–97)
MONOCYTES # BLD AUTO: 0.36 10*3/MM3 (ref 0.1–0.9)
MONOCYTES NFR BLD AUTO: 6.6 % (ref 5–12)
NEUTROPHILS # BLD AUTO: 2.89 10*3/MM3 (ref 1.7–7)
NEUTROPHILS NFR BLD AUTO: 52.6 % (ref 42.7–76)
NRBC BLD AUTO-RTO: 0 /100 WBC (ref 0–0.2)
PLATELET # BLD AUTO: 224 10*3/MM3 (ref 140–450)
PMV BLD AUTO: 11.8 FL (ref 6–12)
POTASSIUM BLD-SCNC: 3.8 MMOL/L (ref 3.5–5.2)
PROT SERPL-MCNC: 7.3 G/DL (ref 6–8.5)
RBC # BLD AUTO: 5.33 10*6/MM3 (ref 4.14–5.8)
SODIUM BLD-SCNC: 139 MMOL/L (ref 136–145)
TSH SERPL DL<=0.05 MIU/L-ACNC: 1.5 UIU/ML (ref 0.27–4.2)
WBC NRBC COR # BLD: 5.49 10*3/MM3 (ref 3.4–10.8)

## 2020-06-04 PROCEDURE — 83516 IMMUNOASSAY NONANTIBODY: CPT

## 2020-06-04 PROCEDURE — 84443 ASSAY THYROID STIM HORMONE: CPT | Performed by: INTERNAL MEDICINE

## 2020-06-04 PROCEDURE — 36415 COLL VENOUS BLD VENIPUNCTURE: CPT

## 2020-06-04 PROCEDURE — 80053 COMPREHEN METABOLIC PANEL: CPT

## 2020-06-04 PROCEDURE — 85025 COMPLETE CBC W/AUTO DIFF WBC: CPT

## 2020-06-04 PROCEDURE — 82784 ASSAY IGA/IGD/IGG/IGM EACH: CPT

## 2020-06-05 LAB — TTG IGA SER-ACNC: <2 U/ML (ref 0–3)

## 2020-06-15 ENCOUNTER — OFFICE VISIT (OUTPATIENT)
Dept: INTERNAL MEDICINE | Facility: CLINIC | Age: 30
End: 2020-06-15

## 2020-06-15 VITALS
SYSTOLIC BLOOD PRESSURE: 120 MMHG | TEMPERATURE: 97.1 F | DIASTOLIC BLOOD PRESSURE: 72 MMHG | OXYGEN SATURATION: 98 % | BODY MASS INDEX: 33.62 KG/M2 | WEIGHT: 227 LBS | HEIGHT: 69 IN | HEART RATE: 79 BPM

## 2020-06-15 DIAGNOSIS — K59.00 CONSTIPATION, UNSPECIFIED CONSTIPATION TYPE: ICD-10-CM

## 2020-06-15 DIAGNOSIS — F41.8 DEPRESSION WITH ANXIETY: Primary | ICD-10-CM

## 2020-06-15 DIAGNOSIS — R10.84 GENERALIZED ABDOMINAL PAIN: ICD-10-CM

## 2020-06-15 PROCEDURE — 99214 OFFICE O/P EST MOD 30 MIN: CPT | Performed by: PHYSICIAN ASSISTANT

## 2020-06-15 RX ORDER — FLUOXETINE 10 MG/1
10 CAPSULE ORAL DAILY
Qty: 30 CAPSULE | Refills: 2 | Status: SHIPPED | OUTPATIENT
Start: 2020-06-15 | End: 2020-07-13 | Stop reason: SDUPTHER

## 2020-06-15 RX ORDER — BUSPIRONE HYDROCHLORIDE 10 MG/1
10 TABLET ORAL 3 TIMES DAILY PRN
Qty: 90 TABLET | Refills: 5 | Status: SHIPPED | OUTPATIENT
Start: 2020-06-15 | End: 2021-08-23

## 2020-06-15 NOTE — PROGRESS NOTES
Justyn Hudson is a 29 y.o. male.     Subjective   History of Present Illness   Here today with concern of possible depression with anxiety.  He reports that in recent months he has constantly been anxious and fearful about his health due to difficulties with abdominal pain and chronic constipation which are currently being evaluated by gastroenterology.  He reports that he dwells on thoughts of and fear of death, although he denies any suicidal or homicidal ideations. He endorses some dysphoric mood and lack of motivation.  When acutely anxious he experiences SOA, palpitations and diaphoresis, although he is able to calm himself down fairly easily and symptoms resolve. He is sleeping well.     He is using Miralax which he feels is helping constipation as he is now having 1-2 small bowel movements per day, although he has continued to have intermittent abdominal pain. Bentyl helps some with abdominal pain when it occurs. For the last week he has had predominantly RUQ abdominal discomfort. He denies nausea, vomiting or diarrhea. Eating does not seem to worsen pain.     Today he also reports some lower back pain for the last few days which sometimes radiates down the right leg at times.  He has been sitting from prolonged periods recently and he admits that pain is better when he is more active.  No numbness, tingling or weakness. No loss of bowel or bladder control.           The following portions of the patient's history were reviewed and updated as appropriate: allergies, current medications, past family history, past medical history, past social history, past surgical history and problem list.    Review of Systems   Constitutional: Positive for diaphoresis (anxiety). Negative for activity change, appetite change, chills, fatigue and fever.   HENT: Negative.    Eyes: Negative for visual disturbance.   Respiratory: Positive for shortness of breath (anxiety). Negative for cough, chest tightness and wheezing.     Cardiovascular: Positive for palpitations (anxiety). Negative for chest pain.   Gastrointestinal: Positive for abdominal pain, constipation and GERD. Negative for abdominal distention, anal bleeding, blood in stool, diarrhea, nausea, vomiting and indigestion.   Endocrine: Negative for polydipsia, polyphagia and polyuria.   Genitourinary: Negative.    Musculoskeletal: Negative.    Skin: Negative.    Allergic/Immunologic: Negative for immunocompromised state.   Neurological: Negative for dizziness, syncope, weakness, numbness, headache and confusion.   Hematological: Negative for adenopathy. Does not bruise/bleed easily.   Psychiatric/Behavioral: Positive for dysphoric mood and depressed mood. Negative for agitation, behavioral problems, decreased concentration, hallucinations, self-injury, sleep disturbance, suicidal ideas, negative for hyperactivity and stress. The patient is nervous/anxious.          Objective    Physical Exam   Constitutional: He is oriented to person, place, and time. He appears well-developed and well-nourished. No distress.   HENT:   Head: Normocephalic and atraumatic.   Eyes: Pupils are equal, round, and reactive to light. Conjunctivae and EOM are normal.   Neck: Normal range of motion. Neck supple.   Cardiovascular: Normal rate, regular rhythm and normal heart sounds. Exam reveals no gallop and no friction rub.   No murmur heard.  Pulmonary/Chest: Effort normal and breath sounds normal. No respiratory distress. He has no wheezes. He has no rales. He exhibits no tenderness.   Abdominal: Soft. Bowel sounds are normal. He exhibits no distension and no mass. There is no hepatosplenomegaly. There is generalized tenderness. There is no rigidity, no rebound, no guarding, no CVA tenderness, no tenderness at McBurney's point and negative Martinez's sign. No hernia.   Musculoskeletal: Normal range of motion. He exhibits no edema, tenderness or deformity.   Neurological: He is alert and oriented to  "person, place, and time. He displays normal reflexes. No cranial nerve deficit or sensory deficit. He exhibits normal muscle tone. Coordination normal.   Skin: Skin is warm and dry. Capillary refill takes less than 2 seconds. No rash noted. He is not diaphoretic.   Psychiatric: His speech is normal and behavior is normal. Judgment and thought content normal. His mood appears anxious. His affect is not angry, not blunt and not inappropriate. He is not agitated, not aggressive, not hyperactive, not slowed, not withdrawn, not actively hallucinating and not combative. Cognition and memory are normal. He is attentive.   Nursing note and vitals reviewed.    PHQ-9 Depression Screening  Little interest or pleasure in doing things? 1   Feeling down, depressed, or hopeless? 2   Trouble falling or staying asleep, or sleeping too much? 0   Feeling tired or having little energy? 1   Poor appetite or overeating? 0   Feeling bad about yourself - or that you are a failure or have let yourself or your family down? 1   Trouble concentrating on things, such as reading the newspaper or watching television? 1   Moving or speaking so slowly that other people could have noticed? Or the opposite - being so fidgety or restless that you have been moving around a lot more than usual? 0   Thoughts that you would be better off dead, or of hurting yourself in some way? 0   PHQ-9 Total Score 6   If you checked off any problems, how difficult have these problems made it for you to do your work, take care of things at home, or get along with other people? Somewhat difficult         /72   Pulse 79   Temp 97.1 °F (36.2 °C)   Ht 174 cm (68.5\")   Wt 103 kg (227 lb)   SpO2 98%   BMI 34.01 kg/m²     Nursing note and vitals reviewed.          Assessment/Plan   Justyn was seen today for follow-up.    Diagnoses and all orders for this visit:    Depression with anxiety  -     Begin: busPIRone (BUSPAR) 10 MG tablet; Take 1 tablet by mouth 3 " "(Three) Times a Day As Needed (anxiety).  -     Begin: FLUoxetine (PROzac) 10 MG capsule; Take 1 capsule by mouth Daily.  Risks, benefits and potential side effects of medication reviewed and patient agrees to use.     Constipation, unspecified constipation type  Generalized abdominal pain  Continue MiraLAX daily and even consider increasing to twice daily to achieve a \"cleaning out\" then resume once daily. Continue Bentyl tid prn for abdominal pain. Follow up with gastroenterology as scheduled or sooner if needed.         Return in 1 month for follow-up or sooner if needed.  ER with development of any suicidal or homicidal ideation.           CHRISTOPHER Mckeon  06/15/2020  8:21 AM  "

## 2020-07-06 RX ORDER — POLYETHYLENE GLYCOL 3350 17 G/17G
17 POWDER, FOR SOLUTION ORAL DAILY
Qty: 30 PACKET | Refills: 2 | Status: SHIPPED | OUTPATIENT
Start: 2020-07-06 | End: 2020-08-13 | Stop reason: SDUPTHER

## 2020-07-13 DIAGNOSIS — F41.8 DEPRESSION WITH ANXIETY: ICD-10-CM

## 2020-07-14 RX ORDER — DICYCLOMINE HCL 20 MG
20 TABLET ORAL 3 TIMES DAILY PRN
Qty: 60 TABLET | Refills: 1 | Status: SHIPPED | OUTPATIENT
Start: 2020-07-14 | End: 2020-11-05 | Stop reason: SDUPTHER

## 2020-07-14 RX ORDER — FLUOXETINE 10 MG/1
10 CAPSULE ORAL DAILY
Qty: 30 CAPSULE | Refills: 2 | Status: SHIPPED | OUTPATIENT
Start: 2020-07-14 | End: 2020-07-16 | Stop reason: SDUPTHER

## 2020-07-16 ENCOUNTER — RESULTS ENCOUNTER (OUTPATIENT)
Dept: INTERNAL MEDICINE | Facility: CLINIC | Age: 30
End: 2020-07-16

## 2020-07-16 ENCOUNTER — OFFICE VISIT (OUTPATIENT)
Dept: INTERNAL MEDICINE | Facility: CLINIC | Age: 30
End: 2020-07-16

## 2020-07-16 VITALS
BODY MASS INDEX: 33.03 KG/M2 | TEMPERATURE: 97.1 F | DIASTOLIC BLOOD PRESSURE: 74 MMHG | WEIGHT: 223 LBS | HEIGHT: 69 IN | HEART RATE: 73 BPM | OXYGEN SATURATION: 99 % | SYSTOLIC BLOOD PRESSURE: 120 MMHG

## 2020-07-16 DIAGNOSIS — R30.0 DYSURIA: ICD-10-CM

## 2020-07-16 DIAGNOSIS — F41.8 DEPRESSION WITH ANXIETY: Primary | ICD-10-CM

## 2020-07-16 DIAGNOSIS — R10.2 SUPRAPUBIC PAIN: ICD-10-CM

## 2020-07-16 LAB
BILIRUB BLD-MCNC: NEGATIVE MG/DL
CLARITY, POC: CLEAR
COLOR UR: YELLOW
GLUCOSE UR STRIP-MCNC: NEGATIVE MG/DL
KETONES UR QL: ABNORMAL
LEUKOCYTE EST, POC: NEGATIVE
NITRITE UR-MCNC: NEGATIVE MG/ML
PH UR: 6 [PH] (ref 5–8)
PROT UR STRIP-MCNC: NEGATIVE MG/DL
RBC # UR STRIP: NEGATIVE /UL
SP GR UR: 1.02 (ref 1–1.03)
UROBILINOGEN UR QL: NORMAL

## 2020-07-16 PROCEDURE — 99214 OFFICE O/P EST MOD 30 MIN: CPT | Performed by: PHYSICIAN ASSISTANT

## 2020-07-16 PROCEDURE — 81003 URINALYSIS AUTO W/O SCOPE: CPT | Performed by: PHYSICIAN ASSISTANT

## 2020-07-16 RX ORDER — FLUOXETINE 10 MG/1
10 CAPSULE ORAL DAILY
Qty: 90 CAPSULE | Refills: 2 | Status: SHIPPED | OUTPATIENT
Start: 2020-07-16 | End: 2020-08-15 | Stop reason: SDUPTHER

## 2020-07-16 NOTE — PROGRESS NOTES
Justyn Hudson is a 30 y.o. male.     Subjective   History of Present Illness   Here today for one-month follow-up of depression anxiety after beginning Prozac 10 mg daily and BuSpar 10 mg 3 times daily as needed. He has been taking prozac daily and has noted significant improvement. He has not needed to take Buspar. Sleeping fine. No side effects.     Recently his stomach issues have seemed better. When he does experience abdominal pain he no longer dwells on it and he rarely needs Bentyl. He has had some bladder pressure for the last 2-3 days which is more prominent when he needs to urinate. Urgency and frequency have also been noted. No burning with urination but there is some discomfort. Urine stream has seemed a little weaker. He has noticed a little decreased stool output and his bowel movements have been liquid at times, likely due to Miralax use.         The following portions of the patient's history were reviewed and updated as appropriate: allergies, current medications, past family history, past medical history, past social history, past surgical history and problem list.    Review of Systems   Constitutional: Negative for activity change, appetite change, chills, fatigue and fever.        Intentional weight loss.    HENT: Negative.    Eyes: Negative for visual disturbance.   Respiratory: Negative for cough, chest tightness, shortness of breath and wheezing.    Cardiovascular: Negative for chest pain and palpitations.   Gastrointestinal: Positive for abdominal pain. Negative for constipation, diarrhea, nausea and vomiting.   Endocrine: Negative for polydipsia, polyphagia and polyuria.   Genitourinary: Positive for decreased urine volume, dysuria, frequency and urgency. Negative for difficulty urinating, discharge, flank pain, genital sores, hematuria, nocturia, penile pain, erectile dysfunction, scrotal swelling, testicular pain and urinary incontinence.   Musculoskeletal: Negative.  Negative for gait  problem, neck pain, neck stiffness and bursitis.   Skin: Negative.  Negative for color change, rash and skin lesions.   Allergic/Immunologic: Negative for immunocompromised state.   Neurological: Negative for dizziness, syncope, weakness, numbness, headache and confusion.   Hematological: Negative for adenopathy. Does not bruise/bleed easily.   Psychiatric/Behavioral: Positive for dysphoric mood (stable). Negative for agitation, sleep disturbance and depressed mood. The patient is nervous/anxious (stable).          Objective    Physical Exam   Constitutional: He is oriented to person, place, and time. He appears well-developed and well-nourished. No distress.   Obese.    HENT:   Head: Normocephalic and atraumatic.   Eyes: Pupils are equal, round, and reactive to light. Conjunctivae and EOM are normal.   Neck: Normal range of motion. Neck supple.   Cardiovascular: Normal rate, regular rhythm and normal heart sounds. Exam reveals no gallop and no friction rub.   No murmur heard.  Pulmonary/Chest: Effort normal and breath sounds normal. No respiratory distress. He has no wheezes. He has no rales.   Abdominal: Soft. Bowel sounds are normal. He exhibits no distension and no mass. There is tenderness in the right lower quadrant, suprapubic area and left lower quadrant. There is no rebound, no guarding and no CVA tenderness. No hernia.   Genitourinary: Rectum normal and prostate normal.   Musculoskeletal: Normal range of motion. He exhibits no edema, tenderness or deformity.   Lymphadenopathy:     He has no cervical adenopathy.   Neurological: He is alert and oriented to person, place, and time. He displays normal reflexes. No cranial nerve deficit or sensory deficit. He exhibits normal muscle tone. Coordination normal.   Skin: Skin is warm and dry. Capillary refill takes less than 2 seconds. No rash noted. He is not diaphoretic.   Psychiatric: He has a normal mood and affect. His behavior is normal. Judgment and thought  "content normal.   Nursing note and vitals reviewed.        /74   Pulse 73   Temp 97.1 °F (36.2 °C)   Ht 174 cm (68.5\")   Wt 101 kg (223 lb)   SpO2 99%   BMI 33.41 kg/m²     Nursing note and vitals reviewed.        Assessment/Plan   Justyn was seen today for follow-up.    Diagnoses and all orders for this visit:    Depression with anxiety  -     Well-controlled, continue: FLUoxetine (PROzac) 10 MG capsule; Take 1 capsule by mouth Daily.    Dysuria  -     POC Urinalysis Dipstick, Automated  -     Urine Culture - Urine, Urine, Clean Catch; Future  -     STI/STD PANEL URINE ([facility]) - Urine, Urine, Clean Catch; Future    Suprapubic pain  -     Urine Culture - Urine, Urine, Clean Catch; Future  -     STI/STD PANEL URINE ([facility]) - Urine, Urine, Clean Catch; Future      Brief Urine Lab Results  (Last result in the past 365 days)      Color   Clarity   Blood   Leuk Est   Nitrite   Protein   CREAT   Urine HCG        07/16/20 0929 Yellow Clear Negative Negative Negative Negative             Prostate exam unremarkable.  No clear etiology of symptoms at this time.  Will notify once urine culture and STI panel are reviewed.  Improved diet encouraged to aid in better control of constipation.           CHRISTOPHER Mckeon  07/16/2020  8:58 AM  "

## 2020-07-21 ENCOUNTER — RESULTS ENCOUNTER (OUTPATIENT)
Dept: INTERNAL MEDICINE | Facility: CLINIC | Age: 30
End: 2020-07-21

## 2020-07-21 DIAGNOSIS — R10.2 SUPRAPUBIC PAIN: ICD-10-CM

## 2020-07-21 DIAGNOSIS — R30.0 DYSURIA: ICD-10-CM

## 2020-08-13 RX ORDER — POLYETHYLENE GLYCOL 3350 17 G/17G
17 POWDER, FOR SOLUTION ORAL DAILY
Qty: 30 PACKET | Refills: 2 | Status: SHIPPED | OUTPATIENT
Start: 2020-08-13 | End: 2021-06-01

## 2020-08-15 DIAGNOSIS — F41.8 DEPRESSION WITH ANXIETY: ICD-10-CM

## 2020-08-17 RX ORDER — FLUOXETINE 10 MG/1
10 CAPSULE ORAL DAILY
Qty: 90 CAPSULE | Refills: 2 | Status: SHIPPED | OUTPATIENT
Start: 2020-08-17 | End: 2020-11-19 | Stop reason: SDUPTHER

## 2020-11-05 DIAGNOSIS — I10 ESSENTIAL HYPERTENSION: ICD-10-CM

## 2020-11-06 RX ORDER — DICYCLOMINE HCL 20 MG
20 TABLET ORAL 3 TIMES DAILY PRN
Qty: 60 TABLET | Refills: 1 | Status: SHIPPED | OUTPATIENT
Start: 2020-11-06 | End: 2021-06-01

## 2020-11-06 RX ORDER — LOSARTAN POTASSIUM 25 MG/1
25 TABLET ORAL DAILY
Qty: 90 TABLET | Refills: 1 | Status: SHIPPED | OUTPATIENT
Start: 2020-11-06 | End: 2021-05-10

## 2020-11-19 DIAGNOSIS — F41.8 DEPRESSION WITH ANXIETY: ICD-10-CM

## 2020-11-19 RX ORDER — FLUOXETINE 10 MG/1
10 CAPSULE ORAL DAILY
Qty: 90 CAPSULE | Refills: 3 | Status: SHIPPED | OUTPATIENT
Start: 2020-11-19 | End: 2021-01-25 | Stop reason: SDUPTHER

## 2021-01-25 ENCOUNTER — OFFICE VISIT (OUTPATIENT)
Dept: INTERNAL MEDICINE | Facility: CLINIC | Age: 31
End: 2021-01-25

## 2021-01-25 VITALS
DIASTOLIC BLOOD PRESSURE: 70 MMHG | TEMPERATURE: 97.2 F | HEART RATE: 80 BPM | SYSTOLIC BLOOD PRESSURE: 126 MMHG | HEIGHT: 69 IN | OXYGEN SATURATION: 99 % | WEIGHT: 213 LBS | BODY MASS INDEX: 31.55 KG/M2

## 2021-01-25 DIAGNOSIS — F41.8 DEPRESSION WITH ANXIETY: ICD-10-CM

## 2021-01-25 DIAGNOSIS — B00.1 COLD SORE: Primary | ICD-10-CM

## 2021-01-25 DIAGNOSIS — J34.0 NASAL SEPTAL ABSCESS: ICD-10-CM

## 2021-01-25 PROCEDURE — 99214 OFFICE O/P EST MOD 30 MIN: CPT | Performed by: PHYSICIAN ASSISTANT

## 2021-01-25 RX ORDER — VALACYCLOVIR HYDROCHLORIDE 1 G/1
2000 TABLET, FILM COATED ORAL 2 TIMES DAILY
Qty: 4 TABLET | Refills: 5 | Status: SHIPPED | OUTPATIENT
Start: 2021-01-25 | End: 2021-03-02 | Stop reason: SDUPTHER

## 2021-01-25 RX ORDER — FLUOXETINE HYDROCHLORIDE 20 MG/1
20 CAPSULE ORAL DAILY
Qty: 90 CAPSULE | Refills: 3 | Status: SHIPPED | OUTPATIENT
Start: 2021-01-25 | End: 2021-02-25 | Stop reason: SDUPTHER

## 2021-01-25 NOTE — PROGRESS NOTES
"Chief Complaint  sore in nose    Subjective          Justyn Yeager presents to [facility] for nasal lesion.  History of Present Illness  At age 21 he had surgery on the nasal septum. In past years he would get a sore on the left side of the septum like a small pimple that would pop and go away but in the last year the pimple has been much larger and produce purulent discharge if touched. The sore has also been returning much quicker in the last year, occurring twice in the last month. He recently was looking up his nose and saw something black sticking out of the septum and got hold of it with tweezers but did not attempt to remove it. He has not been applying anything topically.     He has been taking Buspar each morning with Prozac due to feeling more anxious recently due to an upcoming job change. He denies dysphoric mood, SI, HI or sleep disturbances.        Objective   Vital Signs:   /70   Pulse 80   Temp 97.2 °F (36.2 °C)   Ht 174 cm (68.5\")   Wt 96.6 kg (213 lb)   SpO2 99%   BMI 31.91 kg/m²     Physical Exam  Vitals signs and nursing note reviewed.   Constitutional:       General: He is not in acute distress.     Appearance: He is well-developed. He is obese. He is not ill-appearing, toxic-appearing or diaphoretic.   HENT:      Head: Normocephalic and atraumatic.      Right Ear: External ear normal.      Left Ear: External ear normal.      Nose: Septal deviation and mucosal edema present. No nasal tenderness, congestion or rhinorrhea.      Right Nostril: No epistaxis.      Left Nostril: No epistaxis.      Right Turbinates: Not enlarged, swollen or pale.      Left Turbinates: Not enlarged, swollen or pale.      Right Sinus: No maxillary sinus tenderness or frontal sinus tenderness.      Left Sinus: No maxillary sinus tenderness or frontal sinus tenderness.        Mouth/Throat:      Mouth: Mucous membranes are moist.      Pharynx: No posterior oropharyngeal erythema. "   Eyes:      General: No scleral icterus.     Extraocular Movements: Extraocular movements intact.      Conjunctiva/sclera: Conjunctivae normal.      Pupils: Pupils are equal, round, and reactive to light.   Neck:      Musculoskeletal: Normal range of motion and neck supple.   Cardiovascular:      Rate and Rhythm: Normal rate and regular rhythm.      Heart sounds: Normal heart sounds. No murmur. No friction rub. No gallop.    Pulmonary:      Effort: Pulmonary effort is normal. No respiratory distress.      Breath sounds: Normal breath sounds. No wheezing, rhonchi or rales.   Chest:      Chest wall: No tenderness.   Abdominal:      General: Bowel sounds are normal.      Palpations: Abdomen is soft.      Tenderness: There is no abdominal tenderness. There is no right CVA tenderness, left CVA tenderness or guarding.   Musculoskeletal: Normal range of motion.         General: No tenderness or deformity.      Right lower leg: No edema.      Left lower leg: No edema.   Lymphadenopathy:      Cervical: No cervical adenopathy.   Skin:     General: Skin is warm and dry.      Capillary Refill: Capillary refill takes less than 2 seconds.      Findings: Lesion (pustule in healing phase on distal nasal septum) present. No rash.   Neurological:      Mental Status: He is alert and oriented to person, place, and time.      Cranial Nerves: No cranial nerve deficit.      Sensory: No sensory deficit.      Motor: No abnormal muscle tone.      Coordination: Coordination normal.      Gait: Gait normal.      Deep Tendon Reflexes: Reflexes normal.   Psychiatric:         Attention and Perception: Attention and perception normal.         Mood and Affect: Affect normal. Mood is anxious (minimal). Mood is not depressed. Affect is not labile, blunt, angry or tearful.         Speech: Speech normal.         Behavior: Behavior normal. Behavior is cooperative.         Thought Content: Thought content normal.         Cognition and Memory: Cognition  and memory normal.         Judgment: Judgment normal.               Assessment and Plan    Problem List Items Addressed This Visit        Mental Health    Depression with anxiety    Relevant Medications    Dose increase: FLUoxetine (PROzac) 20 MG capsule      Other Visit Diagnoses     Cold sore    -  Primary    Relevant Medications    Use prn at onset of cold sore: valACYclovir (Valtrex) 1000 MG tablet    Nasal septal abscess        Relevant Medications    Begin: mupirocin (Bactroban) 2 % ointment    Other Relevant Orders    Ambulatory Referral to ENT (Otolaryngology)    Culture, Routine - Swab, Nares (to be collected the next time discharge is present)            Follow Up   Return for Annual physical.  Patient was given instructions and counseling regarding his condition or for health maintenance advice. Please see specific information pulled into the AVS if appropriate.

## 2021-02-25 DIAGNOSIS — F41.8 DEPRESSION WITH ANXIETY: ICD-10-CM

## 2021-02-25 RX ORDER — FLUOXETINE 10 MG/1
10 CAPSULE ORAL DAILY
Qty: 90 CAPSULE | Refills: 1 | Status: SHIPPED | OUTPATIENT
Start: 2021-02-25 | End: 2022-10-03 | Stop reason: SDUPTHER

## 2021-03-02 DIAGNOSIS — B00.1 COLD SORE: ICD-10-CM

## 2021-03-02 RX ORDER — VALACYCLOVIR HYDROCHLORIDE 1 G/1
2000 TABLET, FILM COATED ORAL 2 TIMES DAILY
Qty: 4 TABLET | Refills: 5 | Status: SHIPPED | OUTPATIENT
Start: 2021-03-02 | End: 2021-03-03

## 2021-03-23 ENCOUNTER — BULK ORDERING (OUTPATIENT)
Dept: CASE MANAGEMENT | Facility: OTHER | Age: 31
End: 2021-03-23

## 2021-03-23 DIAGNOSIS — Z23 IMMUNIZATION DUE: ICD-10-CM

## 2021-05-10 DIAGNOSIS — I10 ESSENTIAL HYPERTENSION: ICD-10-CM

## 2021-05-10 RX ORDER — LOSARTAN POTASSIUM 25 MG/1
TABLET ORAL
Qty: 90 TABLET | Refills: 3 | Status: SHIPPED | OUTPATIENT
Start: 2021-05-10 | End: 2022-10-03 | Stop reason: SDUPTHER

## 2021-08-21 DIAGNOSIS — F41.8 DEPRESSION WITH ANXIETY: ICD-10-CM

## 2021-08-23 RX ORDER — BUSPIRONE HYDROCHLORIDE 10 MG/1
TABLET ORAL
Qty: 90 TABLET | Refills: 0 | Status: SHIPPED | OUTPATIENT
Start: 2021-08-23 | End: 2022-10-03 | Stop reason: SDUPTHER

## 2021-11-30 ENCOUNTER — TELEMEDICINE (OUTPATIENT)
Dept: FAMILY MEDICINE CLINIC | Facility: TELEHEALTH | Age: 31
End: 2021-11-30

## 2021-11-30 DIAGNOSIS — B00.1 RECURRENT COLD SORES: ICD-10-CM

## 2021-11-30 DIAGNOSIS — K52.9 GASTROENTERITIS: Primary | ICD-10-CM

## 2021-11-30 PROCEDURE — 99214 OFFICE O/P EST MOD 30 MIN: CPT | Performed by: NURSE PRACTITIONER

## 2021-11-30 RX ORDER — ONDANSETRON 4 MG/1
4 TABLET, ORALLY DISINTEGRATING ORAL EVERY 8 HOURS PRN
Qty: 9 TABLET | Refills: 0 | Status: SHIPPED | OUTPATIENT
Start: 2021-11-30 | End: 2021-12-03

## 2021-11-30 RX ORDER — VALACYCLOVIR HYDROCHLORIDE 1 G/1
2000 TABLET, FILM COATED ORAL 2 TIMES DAILY
Qty: 4 TABLET | Refills: 0 | Status: SHIPPED | OUTPATIENT
Start: 2021-11-30 | End: 2021-12-01

## 2021-11-30 NOTE — PROGRESS NOTES
You have chosen to receive care through a telehealth visit.  Do you consent to use a video/audio connection for your medical care today? Yes     CHIEF COMPLAINT  Chief Complaint   Patient presents with   • Vomiting   • Nausea         HPI  Justyn Yeager is a 31 y.o. male  presents with complaint of nausea, vomiting, and chills that started about 1 hour ago. Denies fever (temp 98.8), body aches, headache, earache, sore throat, runny or stuffy nose, cough, SOA, wheezing, difficulty breathing, or loss of taste or smell. He states he did eat at [employer] yesterday with his partner, who also developed similar symptoms in the last 24 hours. He reports emesis x 2, appearance of undigested food. Denies bloody stool, bloody or coffee grounds emesis, or abdominal pain. He states he is also having a cold sore breakout across his upper lip, for which he usually takes valacyclovir, and he is currently out of that medication. He is requesting a work excuse as well.     Review of Systems   Constitutional: Positive for appetite change and chills. Negative for activity change, fatigue and fever.   HENT: Positive for mouth sores. Negative for congestion, ear pain, postnasal drip, rhinorrhea, sinus pressure, sinus pain, sneezing, sore throat, tinnitus, trouble swallowing and voice change.    Respiratory: Negative for cough, chest tightness, shortness of breath and wheezing.    Cardiovascular: Negative for chest pain.   Gastrointestinal: Positive for diarrhea, nausea and vomiting. Negative for abdominal pain, anal bleeding and blood in stool.   Musculoskeletal: Negative for myalgias.   Skin: Negative for rash.   Neurological: Negative for headaches.   All other systems reviewed and are negative.      Past Medical History:   Diagnosis Date   • Hypertension        Family History   Problem Relation Age of Onset   • Heart attack Mother         sometimes in her 40s   • Obesity Mother    • Migraines Mother    • Hypertension Father    •  "Obesity Father    • Elilani Parkinson White syndrome Father    • Cancer Maternal Grandmother         possibly lung cancer, unsure   • Diabetes Paternal Grandmother    • Dementia Paternal Grandmother    • Stroke Paternal Grandmother        Social History     Socioeconomic History   • Marital status: Single   Tobacco Use   • Smoking status: Never Smoker   • Smokeless tobacco: Never Used   Substance and Sexual Activity   • Alcohol use: Yes     Comment: 4 drinks a week   • Drug use: No   • Sexual activity: Yes     Partners: Male     Comment: , but recent \"experimenting\" with extramarital partner         There were no vitals taken for this visit.    PHYSICAL EXAM  Physical Exam   Constitutional: He is oriented to person, place, and time. He appears well-developed and well-nourished. He does not have a sickly appearance. He appears ill. No distress.   HENT:   Head: Normocephalic and atraumatic.   Right Ear: Hearing and external ear normal.   Left Ear: Hearing and external ear normal.   Nose: Nose normal.   Mouth/Throat: Oral lesions (consistent with appearance of cold sores) present.       Eyes: Conjunctivae and EOM are normal.   Pulmonary/Chest: Effort normal. No stridor.  No respiratory distress. He no audible wheeze...  Abdominal: There is no abdominal tenderness (denies per patient report).   Neurological: He is alert and oriented to person, place, and time.   Skin: Skin is dry.   Psychiatric: He has a normal mood and affect.           Diagnoses and all orders for this visit:    1. Gastroenteritis (Primary)  -     ondansetron ODT (Zofran ODT) 4 MG disintegrating tablet; Place 1 tablet on the tongue Every 8 (Eight) Hours As Needed for Nausea or Vomiting for up to 3 days.  Dispense: 9 tablet; Refill: 0    2. Recurrent cold sores  -     valACYclovir (VALTREX) 1000 MG tablet; Take 2 tablets by mouth 2 (Two) Times a Day for 1 day.  Dispense: 4 tablet; Refill: 0    Rest, get plenty of clear fluids, OTC pain reliever of " choice per package directions as needed; bland diet for 24-48 hours post n/v. Follow up in person with PCP/UC/ER for no improvement or for new or worsening symptoms. Patient verbalized understanding.       FOLLOW-UP  As discussed during visit with PCP if no improvement or Urgent Care/Emergency Department if worsening of symptoms    Patient verbalizes understanding of medication dosage, comfort measures, instructions for treatment and follow-up.    April Mackenzie, APRN  11/30/2021  04:35 EST    This visit was performed via Telehealth.  This patient has been instructed to follow-up with their primary care provider if their symptoms worsen or the treatment provided does not resolve their illness.    The use of a video visit has been reviewed with the patient and verbal informed consent has been obtained. Myself and  Justyn Yeager    participated in this visit. The patient is located in [place], [place]. I am located in [place], [place]. Mychart and Zoom were utilized. I spent 33  minutes in the patient's chart for this visit.

## 2021-11-30 NOTE — PATIENT INSTRUCTIONS
Viral Gastroenteritis, Adult    Viral gastroenteritis is also known as the stomach flu. This condition may affect your stomach, your small intestine, and your large intestine. It can cause sudden watery poop (diarrhea), fever, and throwing up (vomiting). This condition is caused by certain germs (viruses). These germs can be passed from person to person very easily (are contagious).  Having watery poop and throwing up can make you feel weak and cause you to not have enough water in your body (get dehydrated). This can make you tired and thirsty, make you have a dry mouth, and make it so you pee (urinate) less often. It is important to replace the fluids that you lose from having watery poop and throwing up.  What are the causes?  · You can get sick by catching viruses from other people.  · You can also get sick by:  ? Eating food, drinking water, or touching a surface that has the viruses on it (is contaminated).  ? Sharing utensils or other personal items with a person who is sick.  What increases the risk?  · Having a weak body defense system (immune system).  · Living with one or more children who are younger than 2 years old.  · Living in a nursing home.  · Going on cruise ships.  What are the signs or symptoms?  Symptoms of this condition start suddenly. Symptoms may last for a few days or for as long as a week.  · Common symptoms include:  ? Watery poop.  ? Throwing up.  · Other symptoms include:  ? Fever.  ? Headache.  ? Feeling tired (fatigue).  ? Pain in the belly (abdomen).  ? Chills.  ? Feeling weak.  ? Feeling sick to your stomach (nauseous).  ? Muscle aches.  ? Not feeling hungry.  How is this treated?  · This condition typically goes away on its own.  · The focus of treatment is to replace the fluids that you lose. This condition may be treated with:  ? An ORS (oral rehydration solution). This is a drink that is sold at pharmacies and stores.  ? Medicines to help with your symptoms.  ? Probiotic  supplements to reduce symptoms of diarrhea.  ? Fluids given through an IV tube, if needed.  · Older adults and people with other diseases or a weak body defense system are at higher risk for not having enough water in the body.  Follow these instructions at home:  Eating and drinking    · Take an ORS as told by your doctor.  · Drink clear fluids in small amounts as you are able. Clear fluids include:  ? Water.  ? Ice chips.  ? Fruit juice with water added to it (diluted).  ? Low-calorie sports drinks.  · Drink enough fluid to keep your pee (urine) pale yellow.  · Eat small amounts of healthy foods every 3-4 hours as you are able. This may include whole grains, fruits, vegetables, lean meats, and yogurt.  · Avoid fluids that have a lot of sugar or caffeine in them, such as energy drinks, sports drinks, and soda.  · Avoid spicy or fatty foods.  · Avoid alcohol.    General instructions    · Wash your hands often. This is very important after you have watery poop or you throw up. If you cannot use soap and water, use hand .  · Make sure that all people in your home wash their hands well and often.  · Take over-the-counter and prescription medicines only as told by your doctor.  · Rest at home while you get better.  · Watch your condition for any changes.  · Take a warm bath to help with any burning or pain from having watery poop.  · Keep all follow-up visits as told by your doctor. This is important.    Contact a doctor if:  · You cannot keep fluids down.  · Your symptoms get worse.  · You have new symptoms.  · You feel light-headed.  · You feel dizzy.  · You have muscle cramps.  Get help right away if:  · You have chest pain.  · You feel very weak.  · You pass out (faint).  · You see blood in your throw-up.  · Your throw-up looks like coffee grounds.  · You have bloody or black poop (stools) or poop that looks like tar.  · You have a very bad headache, or a stiff neck, or both.  · You have a rash.  · You have  very bad pain, cramping, or bloating in your belly.  · You have trouble breathing.  · You are breathing very quickly.  · You have a fast heartbeat.  · Your skin feels cold and clammy.  · You feel mixed up (confused).  · You have pain when you pee.  · You have signs of not having enough water in the body, such as:  ? Dark pee, hardly any pee, or no pee.  ? Cracked lips.  ? Dry mouth.  ? Sunken eyes.  ? Feeling very sleepy.  ? Feeling weak.  Summary  · Viral gastroenteritis is also known as the stomach flu.  · This condition can cause sudden watery poop (diarrhea), fever, and throwing up (vomiting).  · These germs can be passed from person to person very easily.  · Take an ORS as told by your doctor. This is a drink that is sold at pharmacies and stores.  · Drink fluids in small amounts many times each day as you are able.  This information is not intended to replace advice given to you by your health care provider. Make sure you discuss any questions you have with your health care provider.  Document Revised: 10/23/2019 Document Reviewed: 10/23/2019  [employer] Patient Education © 2021 [employer]    Cold Sore    A cold sore, also called a fever blister, is a small, fluid-filled sore that forms inside of the mouth or on the lips, gums, nose, chin, or cheeks. Cold sores can spread to other parts of the body, such as the eyes or fingers.  Cold sores can spread from person to person (are contagious) until the sores crust over completely. Most cold sores go away within 2 weeks.  What are the causes?  Cold sores are caused by a virus (herpes simplex virus type 1, HSV-1). The virus can spread from person to person through close contact, such as through:  · Kissing.  · Touching the affected area.  · Sharing personal items such as lip balm, razors, a drinking glass, or eating utensils.  What increases the risk?  You are more likely to develop this condition if you:  · Are tired, stressed, or sick.  · Are having your period  (menstruating).  · Are pregnant.  · Take certain medicines.  · Are out in cold weather or get too much sun.  What are the signs or symptoms?  Symptoms of a cold sore outbreak go through different stages. These are the stages of a cold sore:  · Tingling, itching, or burning is felt 1-2 days before the outbreak.  · Fluid-filled blisters appear on the lips, inside the mouth, on the nose, or on the cheeks.  · The blisters start to ooze clear fluid.  · The blisters dry up, and a yellow crust appears in their place.  · The crust falls off.  In some cases, other symptoms can develop during a cold sore outbreak. These can include:  · Fever.  · Sore throat.  · Headache.  · Muscle aches.  · Swollen neck glands.  How is this treated?  There is no cure for cold sores or the virus that causes them. There is also no vaccine to prevent the virus. Most cold sores go away on their own without treatment within 2 weeks. Your doctor may prescribe medicines to:  · Help with pain.  · Keep the virus from growing.  · Help you heal faster.  Medicines may be in the form of creams, gels, pills, or a shot.  Follow these instructions at home:  Medicines  · Take or apply over-the-counter and prescription medicines only as told by your doctor.  · Use a cotton-tip swab to apply creams or gels to your sores.  · Ask your doctor if you can take lysine supplements. These may help with healing.  Sore care    · Do not touch the sores or pick the scabs.  · Wash your hands often. Do not touch your eyes without washing your hands first.  · Keep the sores clean and dry.  · If told, put ice on the sores:  ? Put ice in a plastic bag.  ? Place a towel between your skin and the bag.  ? Leave the ice on for 20 minutes, 2-3 times a day.    Eating and drinking  · Eat a soft, bland diet. Avoid eating hot, cold, or salty foods. These can hurt your mouth.  · Use a straw if it hurts to drink out of a glass.  · Eat foods that have a lot of lysine in them. These include  meat, fish, and dairy products.  · Avoid sugary foods, chocolates, nuts, and grains. These foods have a high amount of a substance (arginine) that can cause the virus to grow.  Lifestyle  · Do not kiss, have oral sex, or share personal items until your sores heal.  · Stress, poor sleep, and being out in the sun can trigger outbreaks. Make sure you:  ? Do activities that help you relax, such as deep breathing exercises or meditation.  ? Get enough sleep.  ? Apply sunscreen on your lips before you go out in the sun.  Contact a doctor if:  · You have symptoms for more than 2 weeks.  · You have pus coming from the sores.  · You have redness that is spreading.  · You have pain or irritation in your eye.  · You get sores on your genitals.  · Your sores do not heal within 2 weeks.  · You get cold sores often.  Get help right away if:  · You have a fever and your symptoms suddenly get worse.  · You have a headache and confusion.  · You have tiredness (fatigue).  · You do not want to eat as much as normal (loss of appetite).  · You have a stiff neck or are sensitive to light.  Summary  · A cold sore is a small, fluid-filled sore that forms inside of the mouth or on the lips, gums, nose, chin, or cheeks.  · Cold sores can spread from person to person (are contagious) until the sores crust over completely. Most cold sores go away within 2 weeks.  · Wash your hands often. Do not touch your eyes without washing your hands first.  · Do not kiss, have oral sex, or share personal items until your sores heal.  · Contact a doctor if your sores do not heal within 2 weeks.  This information is not intended to replace advice given to you by your health care provider. Make sure you discuss any questions you have with your health care provider.  Document Revised: 04/08/2020 Document Reviewed: 05/20/2019  [employer] Patient Education © 2021 [employer]  Ondansetron oral dissolving tablet  What is this medicine?  ONDANSETRON (on DAN se  yaya) is used to treat nausea and vomiting caused by chemotherapy. It is also used to prevent or treat nausea and vomiting after surgery.  This medicine may be used for other purposes; ask your health care provider or pharmacist if you have questions.  COMMON BRAND NAME(S): Zofran ODT  What should I tell my health care provider before I take this medicine?  They need to know if you have any of these conditions:  · heart disease  · history of irregular heartbeat  · liver disease  · low levels of magnesium or potassium in the blood  · an unusual or allergic reaction to ondansetron, granisetron, other medicines, foods, dyes, or preservatives  · pregnant or trying to get pregnant  · breast-feeding  How should I use this medicine?  These tablets are made to dissolve in the mouth. Do not try to push the tablet through the foil backing. With dry hands, peel away the foil backing and gently remove the tablet. Place the tablet in the mouth and allow it to dissolve, then swallow. While you may take these tablets with water, it is not necessary to do so.  Talk to your pediatrician regarding the use of this medicine in children. Special care may be needed.  Overdosage: If you think you have taken too much of this medicine contact a poison control center or emergency room at once.  NOTE: This medicine is only for you. Do not share this medicine with others.  What if I miss a dose?  If you miss a dose, take it as soon as you can. If it is almost time for your next dose, take only that dose. Do not take double or extra doses.  What may interact with this medicine?  Do not take this medicine with any of the following medications:  · apomorphine  · certain medicines for fungal infections like fluconazole, itraconazole, ketoconazole, posaconazole, voriconazole  · cisapride  · dronedarone  · pimozide  · thioridazine  This medicine may also interact with the following medications:  · carbamazepine  · certain medicines for depression,  anxiety, or psychotic disturbances  · fentanyl  · linezolid  · MAOIs like Carbex, Eldepryl, Marplan, Nardil, and Parnate  · methylene blue (injected into a vein)  · other medicines that prolong the QT interval (cause an abnormal heart rhythm) like dofetilide, ziprasidone  · phenytoin  · rifampicin  · tramadol  This list may not describe all possible interactions. Give your health care provider a list of all the medicines, herbs, non-prescription drugs, or dietary supplements you use. Also tell them if you smoke, drink alcohol, or use illegal drugs. Some items may interact with your medicine.  What should I watch for while using this medicine?  Check with your doctor or health care professional as soon as you can if you have any sign of an allergic reaction.  What side effects may I notice from receiving this medicine?  Side effects that you should report to your doctor or health care professional as soon as possible:  · allergic reactions like skin rash, itching or hives, swelling of the face, lips, or tongue  · breathing problems  · confusion  · dizziness  · fast or irregular heartbeat  · feeling faint or lightheaded, falls  · fever and chills  · loss of balance or coordination  · seizures  · sweating  · swelling of the hands and feet  · tightness in the chest  · tremors  · unusually weak or tired  Side effects that usually do not require medical attention (report to your doctor or health care professional if they continue or are bothersome):  · constipation or diarrhea  · headache  This list may not describe all possible side effects. Call your doctor for medical advice about side effects. You may report side effects to FDA at 7-292-YSP-4620.  Where should I keep my medicine?  Keep out of the reach of children.  Store between 2 and 30 degrees C (36 and 86 degrees F). Throw away any unused medicine after the expiration date.  NOTE: This sheet is a summary. It may not cover all possible information. If you have  questions about this medicine, talk to your doctor, pharmacist, or health care provider.  © 2021 Elsevier/Gold Standard (2019-12-10 07:14:10)  Valacyclovir caplets  What is this medicine?  VALACYCLOVIR (paul ay MOUSTAPHA rascon veer) is an antiviral medicine. It is used to treat or prevent infections caused by certain kinds of viruses. Examples of these infections include herpes and shingles. This medicine will not cure herpes.  This medicine may be used for other purposes; ask your health care provider or pharmacist if you have questions.  COMMON BRAND NAME(S): Valtrex  What should I tell my health care provider before I take this medicine?  They need to know if you have any of these conditions:  · acquired immunodeficiency syndrome (AIDS)  · any other condition that may weaken the immune system  · bone marrow or kidney transplant  · kidney disease  · an unusual or allergic reaction to valacyclovir, acyclovir, ganciclovir, valganciclovir, other medicines, foods, dyes, or preservatives  · pregnant or trying to get pregnant  · breast-feeding  How should I use this medicine?  Take this medicine by mouth with a glass of water. Follow the directions on the prescription label. You can take this medicine with or without food. Take your doses at regular intervals. Do not take your medicine more often than directed. Finish the full course prescribed by your doctor or health care professional even if you think your condition is better. Do not stop taking except on the advice of your doctor or health care professional.  Talk to your pediatrician regarding the use of this medicine in children. While this drug may be prescribed for children as young as 2 years for selected conditions, precautions do apply.  Overdosage: If you think you have taken too much of this medicine contact a poison control center or emergency room at once.  NOTE: This medicine is only for you. Do not share this medicine with others.  What if I miss a dose?  If  you miss a dose, take it as soon as you can. If it is almost time for your next dose, take only that dose. Do not take double or extra doses.  What may interact with this medicine?  Do not take this medicine with any of the following medications:  · cidofovir  This medicine may also interact with the following medications:  · adefovir  · amphotericin B  · certain antibiotics like amikacin, gentamicin, tobramycin, vancomycin  · cimetidine  · cisplatin  · colistin  · cyclosporine  · foscarnet  · lithium  · methotrexate  · probenecid  · tacrolimus  This list may not describe all possible interactions. Give your health care provider a list of all the medicines, herbs, non-prescription drugs, or dietary supplements you use. Also tell them if you smoke, drink alcohol, or use illegal drugs. Some items may interact with your medicine.  What should I watch for while using this medicine?  Tell your doctor or health care professional if your symptoms do not start to get better after 1 week.  This medicine works best when taken early in the course of an infection, within the first 72 hours. Begin treatment as soon as possible after the first signs of infection like tingling, itching, or pain in the affected area.  It is possible that genital herpes may still be spread even when you are not having symptoms. Always use safer sex practices like condoms made of latex or polyurethane whenever you have sexual contact.  You should stay well hydrated while taking this medicine. Drink plenty of fluids.  What side effects may I notice from receiving this medicine?  Side effects that you should report to your doctor or health care professional as soon as possible:  · allergic reactions like skin rash, itching or hives, swelling of the face, lips, or tongue  · aggressive behavior  · confusion  · hallucinations  · problems with balance, talking, walking  · stomach pain  · tremor  · trouble passing urine or change in the amount of  urine  Side effects that usually do not require medical attention (report to your doctor or health care professional if they continue or are bothersome):  · dizziness  · headache  · nausea, vomiting  This list may not describe all possible side effects. Call your doctor for medical advice about side effects. You may report side effects to FDA at 4-799-JXJ-5191.  Where should I keep my medicine?  Keep out of the reach of children.  Store at room temperature between 15 and 25 degrees C (59 and 77 degrees F). Keep container tightly closed. Throw away any unused medicine after the expiration date.  NOTE: This sheet is a summary. It may not cover all possible information. If you have questions about this medicine, talk to your doctor, pharmacist, or health care provider.  © 2021 [employer]/Gold Standard (2020-01-14 12:22:33)

## 2022-02-24 DIAGNOSIS — F41.8 DEPRESSION WITH ANXIETY: ICD-10-CM

## 2022-02-24 RX ORDER — BUSPIRONE HYDROCHLORIDE 10 MG/1
TABLET ORAL
Qty: 90 TABLET | Refills: 0 | OUTPATIENT
Start: 2022-02-24

## 2022-02-24 NOTE — TELEPHONE ENCOUNTER
Attempted to contact patient; left detailed message per release notifying patient that PCP was no longer at office and he would need to contact clinic to schedule appointment with new provider so we could send request to see if they would be comfortable prescribing until appointment.

## 2022-02-24 NOTE — TELEPHONE ENCOUNTER
PATIENT CALL AND STATES THAT HE HAS MOVED OUT OF [facility] AND IS SEEKING A NEW PRIMARY CARE PROVIDER, BUT AS OF NOW HE IS OUT OF HIS MEDICINE, BUT IF IT COULD BE FILLED FOR ONE MORE MONTH, HE WILL HAVE HAD TIME TO GT A NEW PRIMARY CARE PROVIDER.

## 2022-02-24 NOTE — TELEPHONE ENCOUNTER
Notified patient he hasnt been seen here in a year and Grecia is no longer a provider in this office, advised him to be seen at an urgent care or establish care with a new pcp in [place].

## 2022-10-03 ENCOUNTER — OFFICE VISIT (OUTPATIENT)
Dept: INTERNAL MEDICINE | Facility: CLINIC | Age: 32
End: 2022-10-03

## 2022-10-03 ENCOUNTER — APPOINTMENT (OUTPATIENT)
Dept: LAB | Facility: HOSPITAL | Age: 32
End: 2022-10-03

## 2022-10-03 VITALS
WEIGHT: 229.8 LBS | DIASTOLIC BLOOD PRESSURE: 72 MMHG | HEART RATE: 93 BPM | SYSTOLIC BLOOD PRESSURE: 110 MMHG | BODY MASS INDEX: 34.83 KG/M2 | TEMPERATURE: 96.4 F | OXYGEN SATURATION: 97 % | HEIGHT: 68 IN

## 2022-10-03 DIAGNOSIS — I10 PRIMARY HYPERTENSION: ICD-10-CM

## 2022-10-03 DIAGNOSIS — F41.1 GAD (GENERALIZED ANXIETY DISORDER): Primary | ICD-10-CM

## 2022-10-03 DIAGNOSIS — G44.019 EPISODIC CLUSTER HEADACHE, NOT INTRACTABLE: ICD-10-CM

## 2022-10-03 DIAGNOSIS — E55.9 VITAMIN D DEFICIENCY: ICD-10-CM

## 2022-10-03 PROCEDURE — 99214 OFFICE O/P EST MOD 30 MIN: CPT | Performed by: FAMILY MEDICINE

## 2022-10-03 RX ORDER — FLUOXETINE HYDROCHLORIDE 20 MG/1
20 CAPSULE ORAL DAILY
COMMUNITY
Start: 2022-07-25 | End: 2022-10-03 | Stop reason: SDUPTHER

## 2022-10-03 RX ORDER — LOSARTAN POTASSIUM 25 MG/1
25 TABLET ORAL DAILY
COMMUNITY
Start: 2022-04-21 | End: 2022-10-22 | Stop reason: SDUPTHER

## 2022-10-03 RX ORDER — FLUOXETINE HYDROCHLORIDE 40 MG/1
40 CAPSULE ORAL DAILY
Qty: 30 CAPSULE | Refills: 4 | Status: SHIPPED | OUTPATIENT
Start: 2022-10-03

## 2022-10-03 NOTE — PROGRESS NOTES
Office Note     Name: Justyn Yeager    : 1990     MRN: [id number]     Chief Complaint  Headache and Anxiety    Subjective     History of Present Illness:  Justyn Yeager is a 32 y.o. male who presents today for establishing care.  He states that he has had problems with anxiety for years and his mother also had lots of problems with anxiety as well.  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  He has been having panic attacks because of this.  He had been on BuSpar in the past only as needed and did not find it very helpful to use it that way.  He also has hypertension that is stable with his current medication.  He has had the COVID-vaccine and booster and he also has problems with headaches for years and has not seen a neurologist about this and states his mom also had migraines as well.  He denies any suicidal or homicidal ideations.  He has paperwork with him today to be filled out to ask his employer if he can work from home due to panic attacks and anxiety when he goes in.    Past medical history: As above    Social history: Negative for tobacco but does use occasional marijuana, positive for EtOH with 4 beers weekly and he lives at home with his  and currently they are staying in a friend's basement and they have no children and he works for the [employer] in the food stamp departmen  Review of Systems   Respiratory:  "Negative for cough, shortness of breath and wheezing.    Cardiovascular: Negative for chest pain and palpitations.   Gastrointestinal: Negative for blood in stool, diarrhea and vomiting.   Genitourinary: Negative for dysuria, flank pain and genital sores.       Objective     Vital Signs  /72   Pulse 93   Temp 96.4 °F (35.8 °C) (Temporal)   Ht 172.7 cm (68\")   Wt 104 kg (229 lb 12.8 oz)   SpO2 97%   BMI 34.94 kg/m²   Estimated body mass index is 34.94 kg/m² as calculated from the following:    Height as of this encounter: 172.7 cm (68\").    Weight as of this encounter: 104 kg (229 lb 12.8 oz).           Physical Exam  HENT:      Head: Normocephalic and atraumatic.   Neck:      Vascular: No carotid bruit.   Cardiovascular:      Rate and Rhythm: Normal rate and regular rhythm.      Heart sounds: Normal heart sounds. No murmur heard.    No gallop.   Pulmonary:      Effort: Pulmonary effort is normal. No respiratory distress.      Breath sounds: No stridor. No wheezing, rhonchi or rales.   Musculoskeletal:      Cervical back: Normal range of motion and neck supple.      Right lower leg: No edema.      Left lower leg: No edema.   Lymphadenopathy:      Cervical: No cervical adenopathy.   Neurological:      Mental Status: He is alert.          Lab Review:           Assessment and Plan     Diagnoses and all orders for this visit:    1. ABHISHEK (generalized anxiety disorder) (Primary)-increase the fluoxetine from 20 mg up to 40 mg and return in 2 to 3 weeks for recheck and call with any problems  -     FLUoxetine (PROzac) 40 MG capsule; Take 1 capsule by mouth Daily.  Dispense: 30 capsule; Refill: 4    2. Primary hypertension--stable with his current dose of losartan and he will continue this.  -     TSH  -     Lipid Panel  -     Comprehensive Metabolic Panel  -     CBC Auto Differential    3. Vitamin D deficiency--check labs  -     Vitamin D 25 Hydroxy    4.  Panic disorder-increase dose of fluoxetine from 20 mg to " 40 mg and return in 2 to 3 weeks    5.  Headaches-neurology referral--he has been dealing with these for years and his mother also had migraines and he has tried several medications.    Return in 3 weeks call with any problems prn sooner    Follow Up  Return in about 3 weeks (around 10/24/2022) for Recheck.    Nancy Tovar, DO

## 2022-10-04 ENCOUNTER — PATIENT ROUNDING (BHMG ONLY) (OUTPATIENT)
Dept: INTERNAL MEDICINE | Facility: CLINIC | Age: 32
End: 2022-10-04

## 2022-10-04 NOTE — PROGRESS NOTES
A My Chart message has been sent to the patient for [facility] [place] [facility] [place]

## 2022-10-24 RX ORDER — LOSARTAN POTASSIUM 25 MG/1
25 TABLET ORAL DAILY
Qty: 30 TABLET | Refills: 1 | Status: SHIPPED | OUTPATIENT
Start: 2022-10-24

## 2024-08-25 NOTE — TELEPHONE ENCOUNTER
From: Justyn Hudson  To: Grecia Schmitz PA  Sent: 1/16/2020 8:42 AM EST  Subject: Prescription Question    Could I get a refill of my losartan?   ADMIT